# Patient Record
Sex: FEMALE | Race: BLACK OR AFRICAN AMERICAN | Employment: FULL TIME | URBAN - METROPOLITAN AREA
[De-identification: names, ages, dates, MRNs, and addresses within clinical notes are randomized per-mention and may not be internally consistent; named-entity substitution may affect disease eponyms.]

---

## 2017-06-05 ENCOUNTER — APPOINTMENT (OUTPATIENT)
Dept: ULTRASOUND IMAGING | Age: 32
End: 2017-06-05
Attending: PHYSICIAN ASSISTANT
Payer: MEDICAID

## 2017-06-05 ENCOUNTER — HOSPITAL ENCOUNTER (EMERGENCY)
Age: 32
Discharge: LEFT BEFORE DISCHARGE | End: 2017-06-05
Attending: EMERGENCY MEDICINE
Payer: MEDICAID

## 2017-06-05 VITALS
OXYGEN SATURATION: 99 % | SYSTOLIC BLOOD PRESSURE: 102 MMHG | RESPIRATION RATE: 14 BRPM | BODY MASS INDEX: 22.66 KG/M2 | WEIGHT: 153 LBS | DIASTOLIC BLOOD PRESSURE: 62 MMHG | HEART RATE: 78 BPM | TEMPERATURE: 97.3 F | HEIGHT: 69 IN

## 2017-06-05 DIAGNOSIS — N70.93 TUBO-OVARIAN ABSCESS: Primary | ICD-10-CM

## 2017-06-05 LAB
ALBUMIN SERPL BCP-MCNC: 2.8 G/DL (ref 3.4–5)
ALBUMIN/GLOB SERPL: 0.6 {RATIO} (ref 0.8–1.7)
ALP SERPL-CCNC: 68 U/L (ref 45–117)
ALT SERPL-CCNC: 12 U/L (ref 13–56)
ANION GAP BLD CALC-SCNC: 7 MMOL/L (ref 3–18)
APPEARANCE UR: CLEAR
AST SERPL W P-5'-P-CCNC: 9 U/L (ref 15–37)
BACTERIA URNS QL MICRO: ABNORMAL /HPF
BASOPHILS # BLD AUTO: 0.1 K/UL (ref 0–0.06)
BASOPHILS # BLD: 1 % (ref 0–3)
BILIRUB SERPL-MCNC: 0.2 MG/DL (ref 0.2–1)
BILIRUB UR QL: NEGATIVE
BUN SERPL-MCNC: 3 MG/DL (ref 7–18)
BUN/CREAT SERPL: 4 (ref 12–20)
CALCIUM SERPL-MCNC: 9.2 MG/DL (ref 8.5–10.1)
CHLORIDE SERPL-SCNC: 99 MMOL/L (ref 100–108)
CO2 SERPL-SCNC: 33 MMOL/L (ref 21–32)
COLOR UR: YELLOW
CREAT SERPL-MCNC: 0.73 MG/DL (ref 0.6–1.3)
DIFFERENTIAL METHOD BLD: ABNORMAL
EOSINOPHIL # BLD: 0.1 K/UL (ref 0–0.4)
EOSINOPHIL NFR BLD: 1 % (ref 0–5)
EPITH CASTS URNS QL MICRO: ABNORMAL /LPF (ref 0–5)
ERYTHROCYTE [DISTWIDTH] IN BLOOD BY AUTOMATED COUNT: 13.6 % (ref 11.6–14.5)
GLOBULIN SER CALC-MCNC: 4.6 G/DL (ref 2–4)
GLUCOSE SERPL-MCNC: 83 MG/DL (ref 74–99)
GLUCOSE UR STRIP.AUTO-MCNC: NEGATIVE MG/DL
HCG SERPL QL: NEGATIVE
HCT VFR BLD AUTO: 36.4 % (ref 35–45)
HGB BLD-MCNC: 11.8 G/DL (ref 12–16)
HGB UR QL STRIP: ABNORMAL
KETONES UR QL STRIP.AUTO: ABNORMAL MG/DL
LACTATE BLD-SCNC: 0.5 MMOL/L (ref 0.4–2)
LEUKOCYTE ESTERASE UR QL STRIP.AUTO: ABNORMAL
LIPASE SERPL-CCNC: 93 U/L (ref 73–393)
LYMPHOCYTES # BLD AUTO: 33 % (ref 20–51)
LYMPHOCYTES # BLD: 2.7 K/UL (ref 0.8–3.5)
MCH RBC QN AUTO: 29.4 PG (ref 24–34)
MCHC RBC AUTO-ENTMCNC: 32.4 G/DL (ref 31–37)
MCV RBC AUTO: 90.5 FL (ref 74–97)
MONOCYTES # BLD: 0.7 K/UL (ref 0–1)
MONOCYTES NFR BLD AUTO: 8 % (ref 2–9)
MUCOUS THREADS URNS QL MICRO: ABNORMAL /LPF
NEUTS SEG # BLD: 4.6 K/UL (ref 1.8–8)
NEUTS SEG NFR BLD AUTO: 56 % (ref 42–75)
NITRITE UR QL STRIP.AUTO: NEGATIVE
PH UR STRIP: 6 [PH] (ref 5–8)
PLATELET # BLD AUTO: 468 K/UL (ref 135–420)
PLATELET COMMENTS,PCOM: ABNORMAL
PMV BLD AUTO: 10.7 FL (ref 9.2–11.8)
POTASSIUM SERPL-SCNC: 4 MMOL/L (ref 3.5–5.5)
PROT SERPL-MCNC: 7.4 G/DL (ref 6.4–8.2)
PROT UR STRIP-MCNC: ABNORMAL MG/DL
RBC # BLD AUTO: 4.02 M/UL (ref 4.2–5.3)
RBC #/AREA URNS HPF: ABNORMAL /HPF (ref 0–5)
RBC MORPH BLD: ABNORMAL
SERVICE CMNT-IMP: NORMAL
SODIUM SERPL-SCNC: 139 MMOL/L (ref 136–145)
SP GR UR REFRACTOMETRY: 1.02 (ref 1–1.03)
UROBILINOGEN UR QL STRIP.AUTO: 1 EU/DL (ref 0.2–1)
WBC # BLD AUTO: 8.3 K/UL (ref 4.6–13.2)
WBC OTHER # BLD MANUAL: 1 10*3/UL
WBC URNS QL MICRO: ABNORMAL /HPF (ref 0–4)
WET PREP GENITAL: NORMAL

## 2017-06-05 PROCEDURE — 74011000258 HC RX REV CODE- 258: Performed by: PHYSICIAN ASSISTANT

## 2017-06-05 PROCEDURE — 74011250636 HC RX REV CODE- 250/636: Performed by: PHYSICIAN ASSISTANT

## 2017-06-05 PROCEDURE — 99284 EMERGENCY DEPT VISIT MOD MDM: CPT

## 2017-06-05 PROCEDURE — 87491 CHLMYD TRACH DNA AMP PROBE: CPT | Performed by: PHYSICIAN ASSISTANT

## 2017-06-05 PROCEDURE — 87086 URINE CULTURE/COLONY COUNT: CPT | Performed by: PHYSICIAN ASSISTANT

## 2017-06-05 PROCEDURE — 83605 ASSAY OF LACTIC ACID: CPT

## 2017-06-05 PROCEDURE — 96361 HYDRATE IV INFUSION ADD-ON: CPT

## 2017-06-05 PROCEDURE — 80053 COMPREHEN METABOLIC PANEL: CPT | Performed by: PHYSICIAN ASSISTANT

## 2017-06-05 PROCEDURE — 76830 TRANSVAGINAL US NON-OB: CPT

## 2017-06-05 PROCEDURE — 85025 COMPLETE CBC W/AUTO DIFF WBC: CPT | Performed by: PHYSICIAN ASSISTANT

## 2017-06-05 PROCEDURE — 83690 ASSAY OF LIPASE: CPT | Performed by: PHYSICIAN ASSISTANT

## 2017-06-05 PROCEDURE — 96365 THER/PROPH/DIAG IV INF INIT: CPT

## 2017-06-05 PROCEDURE — 96375 TX/PRO/DX INJ NEW DRUG ADDON: CPT

## 2017-06-05 PROCEDURE — 87210 SMEAR WET MOUNT SALINE/INK: CPT | Performed by: PHYSICIAN ASSISTANT

## 2017-06-05 PROCEDURE — 81001 URINALYSIS AUTO W/SCOPE: CPT | Performed by: PHYSICIAN ASSISTANT

## 2017-06-05 PROCEDURE — 84703 CHORIONIC GONADOTROPIN ASSAY: CPT | Performed by: PHYSICIAN ASSISTANT

## 2017-06-05 RX ORDER — KETOROLAC TROMETHAMINE 30 MG/ML
30 INJECTION, SOLUTION INTRAMUSCULAR; INTRAVENOUS
Status: COMPLETED | OUTPATIENT
Start: 2017-06-05 | End: 2017-06-05

## 2017-06-05 RX ORDER — ONDANSETRON 2 MG/ML
4 INJECTION INTRAMUSCULAR; INTRAVENOUS
Status: COMPLETED | OUTPATIENT
Start: 2017-06-05 | End: 2017-06-05

## 2017-06-05 RX ORDER — MORPHINE SULFATE 2 MG/ML
2 INJECTION, SOLUTION INTRAMUSCULAR; INTRAVENOUS
Status: COMPLETED | OUTPATIENT
Start: 2017-06-05 | End: 2017-06-05

## 2017-06-05 RX ADMIN — SODIUM CHLORIDE 1000 ML: 900 INJECTION, SOLUTION INTRAVENOUS at 18:38

## 2017-06-05 RX ADMIN — Medication 2 MG: at 16:34

## 2017-06-05 RX ADMIN — CEFTRIAXONE SODIUM 1 G: 1 INJECTION, POWDER, FOR SOLUTION INTRAMUSCULAR; INTRAVENOUS at 16:35

## 2017-06-05 RX ADMIN — ONDANSETRON 4 MG: 2 INJECTION INTRAMUSCULAR; INTRAVENOUS at 15:59

## 2017-06-05 RX ADMIN — SODIUM CHLORIDE 1000 ML: 900 INJECTION, SOLUTION INTRAVENOUS at 15:58

## 2017-06-05 RX ADMIN — KETOROLAC TROMETHAMINE 30 MG: 30 INJECTION, SOLUTION INTRAMUSCULAR at 16:34

## 2017-06-05 NOTE — ED PROVIDER NOTES
HPI Comments: 3:01 PM Ashlyn Chowdary is a  36 y.o. Normal build  [place] female smoker h/o Depression, Anxiety, Emotional Instability presents to the ED via POV c/o constant worsening bilateral lower pelvic pain, nausea w/o vomiting x 4 days. Decreased appetite. Denies fever/chills, HA, dizziness/LOC, st, cp, cough, sob, v/d, constipation, brbpr, melena, flank pain, blood in urine, difficulty urinating, decreased urination, burning/urgency/freq of urination, vag bleeding, vaginal d/c, vaginal odor. Last BM: Yesterday, Soft, Brown, No straining. LNMP: 5/27/17, ended 6/1/17. PSX: N/A. The history is provided by the patient. Past Medical History:   Diagnosis Date    Anxiety     Depression     Emotional instability        History reviewed. No pertinent surgical history. History reviewed. No pertinent family history. Social History     Social History    Marital status:      Spouse name: N/A    Number of children: N/A    Years of education: N/A     Occupational History    Not on file. Social History Main Topics    Smoking status: Current Every Day Smoker     Packs/day: 0.50    Smokeless tobacco: Not on file    Alcohol use No    Drug use: Yes     Special: Marijuana    Sexual activity: Yes     Partners: Male     Other Topics Concern    Not on file     Social History Narrative         ALLERGIES: Shellfish derived    Review of Systems   Constitutional: Negative for appetite change, chills and fever. HENT: Negative for sore throat, trouble swallowing and voice change. Eyes: Negative for pain, discharge, redness and itching. Respiratory: Negative for cough, choking, chest tightness, shortness of breath, wheezing and stridor. Cardiovascular: Negative for chest pain and palpitations. Gastrointestinal: Positive for abdominal pain and nausea. Negative for blood in stool, constipation, diarrhea and vomiting.    Genitourinary: Positive for pelvic pain. Negative for decreased urine volume, difficulty urinating, dysuria, flank pain, frequency, hematuria, urgency, vaginal bleeding and vaginal discharge. Negative for vaginal odor   Musculoskeletal: Negative for back pain. Skin: Negative for color change, pallor, rash and wound. Neurological: Negative for dizziness, syncope, weakness, light-headedness and headaches. Psychiatric/Behavioral: Negative for behavioral problems. The patient is not nervous/anxious. Vitals:    06/05/17 1416   BP: 99/66   Pulse: 85   Resp: 18   Temp: 98.1 °F (36.7 °C)   SpO2: 98%   Weight: 69.4 kg (153 lb)   Height: 5' 9\" (1.753 m)            Physical Exam   Constitutional: She is oriented to person, place, and time. She appears well-developed and well-nourished. No distress. Cardiovascular: Normal rate, regular rhythm and normal heart sounds. Exam reveals no gallop and no friction rub. No murmur heard. Pulmonary/Chest: Effort normal and breath sounds normal. No respiratory distress. She has no wheezes. She has no rales. Abdominal: Soft. Normal appearance and bowel sounds are normal. She exhibits no distension, no abdominal bruit, no pulsatile midline mass and no mass. There is tenderness in the right lower quadrant, suprapubic area and left lower quadrant. There is guarding. There is no rigidity, no rebound and no CVA tenderness. Hernia confirmed negative in the right inguinal area and confirmed negative in the left inguinal area. Normal inspection. + Guarding. Normoactive BS. Non-distended. No rebound TTP. No CVAT. Genitourinary: Uterus normal. Pelvic exam was performed with patient supine. There is no rash, tenderness, lesion or injury on the right labia. There is no rash, tenderness, lesion or injury on the left labia. Uterus is not deviated, not enlarged, not fixed and not tender. Cervix exhibits motion tenderness and discharge. Cervix exhibits no friability.  Right adnexum displays no mass, no tenderness and no fullness. Left adnexum displays no mass, no tenderness and no fullness. No erythema, tenderness or bleeding in the vagina. No foreign body in the vagina. No signs of injury around the vagina. Vaginal discharge found. Genitourinary Comments: Chaperone: SHAMAR Burden     Moderate Malodorous D/C    Lymphadenopathy:        Right: No inguinal adenopathy present. Left: No inguinal adenopathy present. Neurological: She is alert and oriented to person, place, and time. Skin: Skin is warm and dry. She is not diaphoretic. Nursing note and vitals reviewed. MDM  Number of Diagnoses or Management Options  Tubo-ovarian abscess:   Diagnosis management comments: DDX: Abdominal pain, Pregnancy, Pancreatitis, Ectopic Pregnancy, Gastritis, AMI, Gastroenteritis, Colitis, Diverticulitis, Ovarian Cyst, Ovarian Torsion, Tubo-Ovarian Abscess, PID, PUD, Cholecystitis, Choledocholithiasis, Mesenteric Ischemia, Ectopic Pregnancy Rupture, Pelvic Pain Syndrome, Acute Cystitis, Pyelonephritis, Renal Colic, Biliary Colic, Perforation, Splenic Flexure Syndrome, Abdominal Aortic Aneurysm, Gastrointestinal Bleed. Discussed with JODIE العلي concerning patient Aly Damon, standard discussion of reason for visit, HPI, ROS, PE, and current results available. Recommendation for awaiting labs/imaging results, examine patient and determine dispo. She agrees to assume the care of this patient at this time as the previous provider's shift has come to a close. JODIE Barron  June 5, 2017  6:00 PM     6:23 PM Received report from [place], [place]. Pt awaiting US results. Pt c/o right pelvic pain, discharge, N/V. Pelvic exam per JODIE Hernandez with copious discharge, pt states painful exam. Pt states symptoms x this morning while trying to use restroom. JODIE Norman  7:22 PM US with ?TOA on the right. Discussed results with pt.  Will consult with GYN JODIE Norman  7:32 PM Discussed with Dr. Ignatius Meckel, [facility], will eval pt in ED for oupt management vs surgery. JODIE Vargas  8:50 PM Pt continues to wait for GYN eval.  9:06 PM Pt not in room. Gown on bed. Not in lobby, not outside. Will re-page Dr. Esdras Brambila to update. 9:33 PM Dr. Esdras Brambila in dept to eval pt. She reviewed the images and agrees pt may need admission. If pt returns, start IV abx. Amount and/or Complexity of Data Reviewed  Clinical lab tests: ordered and reviewed  Tests in the radiology section of CPT®: ordered and reviewed  Tests in the medicine section of CPT®: reviewed and ordered  Discussion of test results with the performing providers: yes  Decide to obtain previous medical records or to obtain history from someone other than the patient: yes  Obtain history from someone other than the patient: yes  Review and summarize past medical records: yes  Discuss the patient with other providers: yes  Independent visualization of images, tracings, or specimens: yes    Risk of Complications, Morbidity, and/or Mortality  Presenting problems: moderate  Diagnostic procedures: moderate  Management options: moderate    Patient Progress  Patient progress: stable      Procedures  SCRIBE ATTESTATION STATEMENT  Documented by: Lyubov Correia for, and in the presence of, Karena Caldera PA-C 3:11 PM     Signed by: Mika Rabago, 06/05/17 3:11 PM    PROVIDER ATTESTATION STATEMENT  I personally performed the services described in the documentation, reviewed the documentation, as recorded by the scribe in my presence, and it accurately and completely records my words and actions.   Karena Caldera PA-C

## 2017-06-05 NOTE — ED NOTES
Pt is resting in supine position, extra blankets provided and lights dimmed per pt request. No difficulty or distress noted at this time.

## 2017-06-05 NOTE — DISCHARGE INSTRUCTIONS
[employer] Activation    Thank you for requesting access to [employer]. Please follow the instructions below to securely access and download your online medical record. [employer] allows you to send messages to your doctor, view your test results, renew your prescriptions, schedule appointments, and more. How Do I Sign Up? 1. In your internet browser, go to [email or web address]  2. Click on the First Time User? Click Here link in the Sign In box. You will be redirect to the New Member Sign Up page. 3. Enter your Qwik[employer] Access Code exactly as it appears below. You will not need to use this code after youve completed the sign-up process. If you do not sign up before the expiration date, you must request a new code. Qwik[employer] Access Code: [email or web address]  Expires: 9/3/2017  4:26 PM (This is the date your Qwikwire access code will )    4. Enter the last four digits of your Social Security Number (xxxx) and Date of Birth (mm/dd/yyyy) as indicated and click Submit. You will be taken to the next sign-up page. 5. Create a Qwikwire ID. This will be your [employer]wikwire login ID and cannot be changed, so think of one that is secure and easy to remember. 6. Create a Qwikwire password. You can change your password at any time. 7. Enter your Password Reset Question and Answer. This can be used at a later time if you forget your password. 8. Enter your e-mail address. You will receive e-mail notification when new information is available in [address]. 9. Click Sign Up. You can now view and download portions of your medical record. 10. Click the Download Summary menu link to download a portable copy of your medical information. Additional Information    If you have questions, please visit the Frequently Asked Questions section of the Qwikwire website at [email or web address] [email or web address] Remember, Qwikwire is NOT to be used for urgent needs. For medical emergencies, dial 911.

## 2017-06-05 NOTE — Clinical Note
Drink plenty of water. Please take Tylenol (Acetaminophen) Extra Strength 500 mg tablets, 2 tablets by mouth every 6-8 hours as needed for pain and or fever. NOT TO EXCEED 4000 MG OF ACETAMINOPHEN IN A 24 HOURS PERIOD. You may also take Motrin (Ibupr ofen) 200mg tablets, 3 tablets by mouth every 6-8 hours as needed for pain and or fever, to be taken with food. Do not take other NSAIDS (Non-Steroidal Anti-inflammatories) or medications containing: e.g., Celebrex (Celecoxib), Mobic (Meloxicam), Naproxe n (Naprosyn), Goodies Powder, Aspirin etc... While taking Ibuprofen (Also known as Motrin, Advil). PLEASE FOLLOW-UP AS DIRECTED WITHOUT FAIL WITHIN THE TIME FRAME RECOMMENDED AS FAILURE TO DO SO COULD RESULT IN WORSENING OF YOUR PHYSICAL CONDITION, D EATH, AND OR PERMANENT DISABILITY. RETURN TO THE EMERGENCY DEPARTMENT AT IF YOU ARE UNABLE TO FOLLOW-UP AS DIRECTED. RETURN TO THE EMERGENCY DEPARTMENT AT ONCE IF YOU HAVE SYMPTOMS THAT DO NOT IMPROVE WITH TREATMENT, NEW SYMPTOMS, WORSENING SYMPT OMS, OR ANY OTHER CONCERNS. THE PATIENT AGREES WITH THE DISCHARGE PLAN AND FOLLOW-UP INSTRUCTIONS. THE PATIENT AGREES TO REVIEW ALL HANDOUTS.

## 2017-06-06 ENCOUNTER — HOSPITAL ENCOUNTER (OUTPATIENT)
Age: 32
Setting detail: OBSERVATION
Discharge: HOME | End: 2017-06-07
Attending: EMERGENCY MEDICINE | Admitting: OBSTETRICS & GYNECOLOGY
Payer: MEDICAID

## 2017-06-06 DIAGNOSIS — N73.0 PID (ACUTE PELVIC INFLAMMATORY DISEASE): Primary | ICD-10-CM

## 2017-06-06 DIAGNOSIS — N70.93 RIGHT TUBO-OVARIAN ABSCESS: ICD-10-CM

## 2017-06-06 PROBLEM — N94.89 ADNEXAL MASS: Status: ACTIVE | Noted: 2017-06-05

## 2017-06-06 LAB
ANION GAP BLD CALC-SCNC: 7 MMOL/L (ref 3–18)
APPEARANCE UR: CLEAR
BACTERIA URNS QL MICRO: ABNORMAL /HPF
BASOPHILS # BLD AUTO: 0 K/UL (ref 0–0.06)
BASOPHILS # BLD: 0 % (ref 0–3)
BILIRUB UR QL: NEGATIVE
BUN SERPL-MCNC: 6 MG/DL (ref 7–18)
BUN/CREAT SERPL: 8 (ref 12–20)
CALCIUM SERPL-MCNC: 9.5 MG/DL (ref 8.5–10.1)
CHLORIDE SERPL-SCNC: 104 MMOL/L (ref 100–108)
CO2 SERPL-SCNC: 30 MMOL/L (ref 21–32)
COLOR UR: YELLOW
CREAT SERPL-MCNC: 0.72 MG/DL (ref 0.6–1.3)
DIFFERENTIAL METHOD BLD: ABNORMAL
EOSINOPHIL # BLD: 0 K/UL (ref 0–0.4)
EOSINOPHIL NFR BLD: 0 % (ref 0–5)
EPITH CASTS URNS QL MICRO: ABNORMAL /LPF (ref 0–5)
ERYTHROCYTE [DISTWIDTH] IN BLOOD BY AUTOMATED COUNT: 13.6 % (ref 11.6–14.5)
GLUCOSE SERPL-MCNC: 83 MG/DL (ref 74–99)
GLUCOSE UR STRIP.AUTO-MCNC: 500 MG/DL
HCG UR QL: NEGATIVE
HCT VFR BLD AUTO: 35.9 % (ref 35–45)
HGB BLD-MCNC: 11.8 G/DL (ref 12–16)
HGB UR QL STRIP: ABNORMAL
KETONES UR QL STRIP.AUTO: NEGATIVE MG/DL
LACTATE BLD-SCNC: 0.8 MMOL/L (ref 0.4–2)
LEUKOCYTE ESTERASE UR QL STRIP.AUTO: NEGATIVE
LYMPHOCYTES # BLD AUTO: 45 % (ref 20–51)
LYMPHOCYTES # BLD: 2.9 K/UL (ref 0.8–3.5)
MCH RBC QN AUTO: 29.6 PG (ref 24–34)
MCHC RBC AUTO-ENTMCNC: 32.9 G/DL (ref 31–37)
MCV RBC AUTO: 90 FL (ref 74–97)
MONOCYTES # BLD: 0.2 K/UL (ref 0–1)
MONOCYTES NFR BLD AUTO: 3 % (ref 2–9)
NEUTS SEG # BLD: 3.4 K/UL (ref 1.8–8)
NEUTS SEG NFR BLD AUTO: 52 % (ref 42–75)
NITRITE UR QL STRIP.AUTO: NEGATIVE
PH UR STRIP: 7.5 [PH] (ref 5–8)
PLATELET # BLD AUTO: 527 K/UL (ref 135–420)
PLATELET COMMENTS,PCOM: ABNORMAL
PMV BLD AUTO: 10.5 FL (ref 9.2–11.8)
POTASSIUM SERPL-SCNC: 4.5 MMOL/L (ref 3.5–5.5)
PROT UR STRIP-MCNC: 300 MG/DL
RBC # BLD AUTO: 3.99 M/UL (ref 4.2–5.3)
RBC #/AREA URNS HPF: ABNORMAL /HPF (ref 0–5)
RBC MORPH BLD: ABNORMAL
SODIUM SERPL-SCNC: 141 MMOL/L (ref 136–145)
SP GR UR REFRACTOMETRY: 1.02 (ref 1–1.03)
UROBILINOGEN UR QL STRIP.AUTO: 0.2 EU/DL (ref 0.2–1)
WBC # BLD AUTO: 6.5 K/UL (ref 4.6–13.2)
WBC URNS QL MICRO: ABNORMAL /HPF (ref 0–4)

## 2017-06-06 PROCEDURE — 96365 THER/PROPH/DIAG IV INF INIT: CPT

## 2017-06-06 PROCEDURE — 96367 TX/PROPH/DG ADDL SEQ IV INF: CPT

## 2017-06-06 PROCEDURE — 99218 HC RM OBSERVATION: CPT

## 2017-06-06 PROCEDURE — 74011000258 HC RX REV CODE- 258: Performed by: PHYSICIAN ASSISTANT

## 2017-06-06 PROCEDURE — 74011250636 HC RX REV CODE- 250/636: Performed by: PHYSICIAN ASSISTANT

## 2017-06-06 PROCEDURE — 81001 URINALYSIS AUTO W/SCOPE: CPT | Performed by: EMERGENCY MEDICINE

## 2017-06-06 PROCEDURE — 74011000250 HC RX REV CODE- 250: Performed by: PHYSICIAN ASSISTANT

## 2017-06-06 PROCEDURE — 85025 COMPLETE CBC W/AUTO DIFF WBC: CPT | Performed by: PHYSICIAN ASSISTANT

## 2017-06-06 PROCEDURE — 96375 TX/PRO/DX INJ NEW DRUG ADDON: CPT

## 2017-06-06 PROCEDURE — 99284 EMERGENCY DEPT VISIT MOD MDM: CPT

## 2017-06-06 PROCEDURE — 80048 BASIC METABOLIC PNL TOTAL CA: CPT | Performed by: PHYSICIAN ASSISTANT

## 2017-06-06 PROCEDURE — 83605 ASSAY OF LACTIC ACID: CPT

## 2017-06-06 PROCEDURE — 81025 URINE PREGNANCY TEST: CPT | Performed by: EMERGENCY MEDICINE

## 2017-06-06 PROCEDURE — 87040 BLOOD CULTURE FOR BACTERIA: CPT | Performed by: PHYSICIAN ASSISTANT

## 2017-06-06 PROCEDURE — 96361 HYDRATE IV INFUSION ADD-ON: CPT

## 2017-06-06 RX ORDER — ONDANSETRON 2 MG/ML
4 INJECTION INTRAMUSCULAR; INTRAVENOUS
Status: COMPLETED | OUTPATIENT
Start: 2017-06-06 | End: 2017-06-06

## 2017-06-06 RX ORDER — OXYCODONE AND ACETAMINOPHEN 5; 325 MG/1; MG/1
2 TABLET ORAL
Status: DISCONTINUED | OUTPATIENT
Start: 2017-06-06 | End: 2017-06-07 | Stop reason: HOSPADM

## 2017-06-06 RX ORDER — HYDROMORPHONE HYDROCHLORIDE 1 MG/ML
0.5 INJECTION, SOLUTION INTRAMUSCULAR; INTRAVENOUS; SUBCUTANEOUS
Status: COMPLETED | OUTPATIENT
Start: 2017-06-06 | End: 2017-06-06

## 2017-06-06 RX ORDER — OXYCODONE AND ACETAMINOPHEN 5; 325 MG/1; MG/1
1-2 TABLET ORAL
Status: DISCONTINUED | OUTPATIENT
Start: 2017-06-06 | End: 2017-06-07 | Stop reason: HOSPADM

## 2017-06-06 RX ORDER — IBUPROFEN 400 MG/1
800 TABLET ORAL
Status: DISCONTINUED | OUTPATIENT
Start: 2017-06-06 | End: 2017-06-07 | Stop reason: HOSPADM

## 2017-06-06 RX ADMIN — ONDANSETRON 4 MG: 2 INJECTION INTRAMUSCULAR; INTRAVENOUS at 17:15

## 2017-06-06 RX ADMIN — SODIUM CHLORIDE 1000 ML: 900 INJECTION, SOLUTION INTRAVENOUS at 16:36

## 2017-06-06 RX ADMIN — CEFOTETAN DISODIUM 2 G: 2 INJECTION, POWDER, FOR SOLUTION INTRAMUSCULAR; INTRAVENOUS at 16:37

## 2017-06-06 RX ADMIN — HYDROMORPHONE HYDROCHLORIDE 0.5 MG: 1 INJECTION, SOLUTION INTRAMUSCULAR; INTRAVENOUS; SUBCUTANEOUS at 17:15

## 2017-06-06 RX ADMIN — DOXYCYCLINE 100 MG: 100 INJECTION, POWDER, LYOPHILIZED, FOR SOLUTION INTRAVENOUS at 17:16

## 2017-06-06 NOTE — IP AVS SNAPSHOT
Current Discharge Medication List  
  
START taking these medications Dose & Instructions Dispensing Information Comments Morning Noon Evening Bedtime  
 doxycycline 100 mg capsule Commonly known as:  Kana Layne Your last dose was: Your next dose is:    
   
   
 Dose:  100 mg Take 1 Cap by mouth two (2) times a day for 14 days. Quantity:  28 Cap Refills:  0  
     
   
   
   
  
 ibuprofen 800 mg tablet Commonly known as:  MOTRIN Your last dose was: Your next dose is:    
   
   
 Dose:  800 mg Take 1 Tab by mouth every eight (8) hours as needed. Indications: Pain Quantity:  60 Tab Refills:  3 CONTINUE these medications which have NOT CHANGED Dose & Instructions Dispensing Information Comments Morning Noon Evening Bedtime  
 traZODone 100 mg tablet Commonly known as:  Cheryl Mendoza Your last dose was: Your next dose is:    
   
   
 Dose:  100 mg Take 100 mg by mouth nightly. Refills:  0  
     
   
   
   
  
 VISTARIL 50 mg capsule Generic drug:  hydrOXYzine pamoate Your last dose was: Your next dose is:    
   
   
 Dose:  100 mg Take 100 mg by mouth three (3) times daily as needed for Itching. Refills:  0  
     
   
   
   
  
 ZOLOFT 100 mg tablet Generic drug:  sertraline Your last dose was: Your next dose is: Take  by mouth daily. Refills:  0 Where to Get Your Medications These medications were sent to [address], [address] [facility]  [address] [place] [id number] Phone:  [phone]  
  doxycycline 100 mg capsule  
 ibuprofen 800 mg tablet

## 2017-06-06 NOTE — ED NOTES
I performed a brief evaluation, including history and physical, of the patient here in triage and I have determined that pt will need further treatment and evaluation from the fast track ER physician. I have placed initial orders to help in expediting patients care.      June 06, 2017 at 2:26 PM - Sesar Fitzgerald MD

## 2017-06-06 NOTE — ED PROVIDER NOTES
HPI Comments: Joqauim Al is a 36 y.o. Normal build  [place] female smoker h/o Depression, Anxiety, Emotional Instability presents to the ED via POV presents for evaluation after eloping from the ED yesterday while awaiting OB/GYN Dr. Amparo Clarke, to take her to the OR for tx of suspected TOA. Pt notes constant worsening bilateral lower pelvic pain, nausea w/o vomiting x 45 days. Decreased appetite. Denies fever/chills, HA, dizziness/LOC, st, cp, cough, sob, v/d, constipation, brbpr, melena, flank pain, blood in urine, difficulty urinating, decreased urination, burning/urgency/freq of urination, vag bleeding, vaginal d/c, vaginal odor.      Last BM: 2 days ago, Soft, Brown, No straining.      LNMP: 5/27/17, ended 6/1/17.      PSX: N/A.        Patient is a 36 y.o. female presenting with other event. Other   Associated symptoms include abdominal pain. Pertinent negatives include no headaches and no shortness of breath. Past Medical History:   Diagnosis Date    Adnexal mass 06/05/2017    U/S Result: There is a enlarged, tubular structure within the right adnexa, possibly a dilated fallopian tube. Complex hydrosalpinx or pyosalpinx could be considered. Tubo-ovarian abscess is not entirely excluded.  Anxiety     BV (bacterial vaginosis)     Depression     Emotional instability     Normocytic anemia        History reviewed. No pertinent surgical history. History reviewed. No pertinent family history. Social History     Social History    Marital status:      Spouse name: N/A    Number of children: N/A    Years of education: N/A     Occupational History    Not on file.      Social History Main Topics    Smoking status: Current Every Day Smoker     Packs/day: 0.50    Smokeless tobacco: Not on file    Alcohol use No    Drug use: Yes     Special: Marijuana    Sexual activity: Yes     Partners: Male     Other Topics Concern    Not on file     Social History Narrative         ALLERGIES: Shellfish derived    Review of Systems   Constitutional: Positive for appetite change. Negative for chills and fever. HENT: Negative for mouth sores, sore throat, trouble swallowing and voice change. Eyes: Negative for pain and redness. Respiratory: Negative for cough and shortness of breath. Gastrointestinal: Positive for abdominal pain and nausea. Negative for blood in stool, constipation, diarrhea and vomiting. Genitourinary: Positive for pelvic pain. Negative for decreased urine volume, difficulty urinating, dysuria, flank pain, frequency, hematuria, urgency, vaginal bleeding and vaginal discharge. Neurological: Negative for dizziness, syncope, weakness, light-headedness and headaches. Vitals:    06/06/17 1425   BP: 99/67   Pulse: 94   Resp: 20   Temp: 97.9 °F (36.6 °C)   SpO2: 98%            Physical Exam   Constitutional: She is oriented to person, place, and time. She appears well-developed and well-nourished. No distress. HENT:   Head: Normocephalic and atraumatic. Right Ear: External ear normal.   Left Ear: External ear normal.   Nose: Nose normal.   Mouth/Throat: Oropharynx is clear and moist. No oropharyngeal exudate. Eyes: Conjunctivae and EOM are normal. Pupils are equal, round, and reactive to light. No scleral icterus. Neck: Trachea normal, normal range of motion, full passive range of motion without pain and phonation normal. Neck supple. No tracheal tenderness, no spinous process tenderness and no muscular tenderness present. No rigidity. No tracheal deviation and normal range of motion present. Cardiovascular: Normal rate, regular rhythm and normal heart sounds. Exam reveals no gallop and no friction rub. No murmur heard. Pulmonary/Chest: Effort normal and breath sounds normal. No accessory muscle usage or stridor. No tachypnea. No respiratory distress. She has no decreased breath sounds. She has no wheezes. She has no rhonchi. She has no rales. Symmetric rise/fall of the chest wall. Speaks in full sentences. Great inspiratory effort. Abdominal: Soft. Normal appearance and bowel sounds are normal. She exhibits no distension, no abdominal bruit and no pulsatile midline mass. There is tenderness in the right lower quadrant, suprapubic area and left lower quadrant. There is guarding. There is no rigidity, no rebound and no CVA tenderness. No hernia. Normal inspection. Non-distended. Normoactive BS.     + RLQ/Suprapubic/LLQ TTP. + Guarding. No rebound TTP. No CVAT. Genitourinary:   Genitourinary Comments: Pelvic Exam completed during Yesterday's 6/5/17 ED visit by me. No  exam today. Lymphadenopathy:     She has no cervical adenopathy. Neurological: She is alert and oriented to person, place, and time. She has normal strength. She is not disoriented. No sensory deficit. MS 5/5 BUE/BLE. A&O x 3. Normal gait. Speech clear. Skin: She is not diaphoretic. Nursing note and vitals reviewed. MDM  Number of Diagnoses or Management Options  PID (acute pelvic inflammatory disease): new and requires workup  Right tubo-ovarian abscess: new and requires workup  Diagnosis management comments: DDX: Abdominal pain, Pregnancy, Pancreatitis, Ectopic Pregnancy, Gastritis, AMI, Gastroenteritis, Colitis, Diverticulitis, Ovarian Cyst, Ovarian Torsion, Tubo-Ovarian Abscess, PID, PUD, Cholecystitis, Choledocholithiasis, Mesenteric Ischemia, Ectopic Pregnancy Rupture, Pelvic Pain Syndrome, Acute Cystitis, Pyelonephritis, Renal Colic, Biliary Colic, Perforation, Splenic Flexure Syndrome, Abdominal Aortic Aneurysm, Gastrointestinal Bleed. Pt eloped yesterday while waiting on Dr. Raza Cotnreras OB/[facility] to come bedside and take her to OR for drainage of suspected TOA.      U/S Result 6/5/17:   Final result (Exam End: 6/5/2017  5:48 PM) Open      Study Result     Description: Selected sonographic images of the pelvis are reviewed     Clinical Indication: Severe bilateral pelvic pain for 3 days     Comparison: June 21, 2015.     Findings:      Uterus measures 7.9 x 4.0 x 5.0 cm and has a normal appearance. The cervix  measures 3.6 cm in length and appears closed. There is a small amount of fluid within endometrial canal. Endometrial complex  measures 4 mm in thickness. Right ovary measures 2.6 x 2.4 x 2.3 cm. Within the right adnexa, there is a 4.2  x 2.5 x 2.7 cm homogeneously mildly hypoechoic tubular structure without  internal blood flow. The right ovary appears normally perfused. Left ovary measures 2.8 x 2.1 x 2.0 cm and has a normal appearance. Trace free fluid in the cul-de-sac. No left adnexal mass.     IMPRESSION  Impression:      There is a enlarged, tubular structure within the right adnexa, possibly a  dilated fallopian tube. Complex hydrosalpinx or pyosalpinx could be considered. Tubo-ovarian abscess is not entirely excluded. The ovary itself appears normal.  Careful correlation with pelvic exam necessary. Consider gynecological  consultation.     The left ovary appears normal.     The uterus appears normal.    2:48 PM: [address] page Dr. Gretta Nyhan. Consulted with Dr. Aydee De Anda ([facility]) concerning patient Glory Bales, standard discussion of reason for visit, HPI, ROS, PE, and current results available. Recommendation for admission to Observation. Agrees w/ IV abx chosen. Keep NPO. Call him back if elevated WBC. JODIE Bynum  June 6, 2017  3:50 PM     Reviewed workup results, any meds, and discharge instructions OR admission plan with patient and any family present. Answered all questions.  JODIE Bynum  3:51 PM         Amount and/or Complexity of Data Reviewed  Clinical lab tests: ordered and reviewed  Tests in the radiology section of CPT®: reviewed  Tests in the medicine section of CPT®: reviewed and ordered  Decide to obtain previous medical records or to obtain history from someone other than the patient: yes  Review and summarize past medical records: yes  Discuss the patient with other providers: yes  Independent visualization of images, tracings, or specimens: yes    Risk of Complications, Morbidity, and/or Mortality  Presenting problems: moderate  Diagnostic procedures: moderate        Procedures    Diagnosis:   1. PID (acute pelvic inflammatory disease)    2. Right tubo-ovarian abscess          Disposition: ADMIT    Follow-up Information     None          Patient's Medications   Start Taking    No medications on file   Continue Taking    HYDROXYZINE (VISTARIL) 50 MG CAPSULE    Take 100 mg by mouth three (3) times daily as needed for Itching. SERTRALINE (ZOLOFT) 100 MG TABLET    Take  by mouth daily. TRAZODONE (DESYREL) 100 MG TABLET    Take 100 mg by mouth nightly.    These Medications have changed    No medications on file   Stop Taking    No medications on file       Recent Results (from the past 24 hour(s))   WET PREP    Collection Time: 06/05/17  4:22 PM   Result Value Ref Range    Special Requests: NO SPECIAL REQUESTS      Wet prep MODERATE  CLUE CELLS PRESENT        Wet prep NO TRICHOMONAS SEEN      Wet prep NO YEAST SEEN     POC LACTIC ACID    Collection Time: 06/05/17  7:44 PM   Result Value Ref Range    Lactic Acid (POC) 0.5 0.4 - 2.0 mmol/L   URINALYSIS W/ RFLX MICROSCOPIC    Collection Time: 06/06/17  3:02 PM   Result Value Ref Range    Color YELLOW      Appearance CLEAR      Specific gravity 1.016 1.005 - 1.030      pH (UA) 7.5 5.0 - 8.0      Protein 300 (A) NEG mg/dL    Glucose 500 (A) NEG mg/dL    Ketone NEGATIVE  NEG mg/dL    Bilirubin NEGATIVE  NEG      Blood SMALL (A) NEG      Urobilinogen 0.2 0.2 - 1.0 EU/dL    Nitrites NEGATIVE  NEG      Leukocyte Esterase NEGATIVE  NEG     HCG URINE, QL    Collection Time: 06/06/17  3:02 PM   Result Value Ref Range    HCG urine, Ql. NEGATIVE  NEG

## 2017-06-06 NOTE — PROGRESS NOTES
Bedside and Verbal shift change report given to Michael IBANEZ (oncoming nurse) by Sindhu Griffin LPN (offgoing nurse). Report included the following information SBAR, Kardex, MAR and Recent Results. SITUATION:    Code Status: No Order   Reason for Admission: Right Adnexal Mass, Possible Tubo-Ovarian Abscess    [facility] day: 0   Problem List:       Hospital Problems  Date Reviewed: 6/5/2017    None          BACKGROUND:    Past Medical History:   Past Medical History:   Diagnosis Date    Adnexal mass 06/05/2017    U/S Result: There is a enlarged, tubular structure within the right adnexa, possibly a dilated fallopian tube. Complex hydrosalpinx or pyosalpinx could be considered. Tubo-ovarian abscess is not entirely excluded.                     Anxiety     BV (bacterial vaginosis)     Depression     Emotional instability     Normocytic anemia          Patient taking anticoagulants no     ASSESSMENT:    Changes in Assessment Throughout Shift: none     Patient has Central Line: no   Patient has Baker Cath: no    Last Vitals:     Vitals:    06/06/17 1611 06/06/17 1707 06/06/17 1803 06/06/17 1807   BP: 107/88 110/68 109/69    Pulse: 87 68 95    Resp:  16 18    Temp:  97.6 °F (36.4 °C) 96.9 °F (36.1 °C)    SpO2: 100% 100% 97%    Weight:    72.6 kg (160 lb 1.6 oz)   Height:    5' 9\" (1.753 m)        IV and DRAINS (will only show if present)   Peripheral IV 06/06/17 Left Antecubital-Site Assessment: Clean, dry, & intact     WOUND (if present)   Wound Type:  none   Dressing present     Wound Concerns/Notes:  none     PAIN    Pain Assessment    Pain Intensity 1: 7 (06/06/17 1638)    Pain Location 1: Abdomen         Patient Stated Pain Goal: 7  o Interventions for Pain:  Dilaudid IVIntervention effective: yes  o Time of last intervention: 4129   o Reassessment Completed: yes      Last 3 Weights:  Last 3 Recorded Weights in this Encounter    06/06/17 1807   Weight: 72.6 kg (160 lb 1.6 oz)     Weight change:  INTAKE/OUPUT    Current Shift:      Last three shifts:       LAB RESULTS     Recent Labs      06/06/17   1555  06/05/17   1525   WBC  6.5  8.3   HGB  11.8*  11.8*   HCT  35.9  36.4   PLT  527*  468*        Recent Labs      06/06/17   1555  06/05/17   1525   NA  141  139   K  4.5  4.0   GLU  83  83   BUN  6*  3*   CREA  0.72  0.73   CA  9.5  9.2       RECOMMENDATIONS AND DISCHARGE PLANNING     1. Pending tests/procedures/ Plan of Care or Other Needs: None     2. Discharge plan for patient and Needs/Barriers: home    3. Estimated Discharge Date: 6/8/2017 Posted on Whiteboard in [facility] Room: yes      4. The patient's care plan was reviewed with the oncoming nurse. \"HEALS\" SAFETY CHECK      Fall Risk    Total Score: 1    Safety Measures:      A safety check occurred in the patient's room between off going nurse and oncoming nurse listed above. The safety check included the below items  Area Items   H  High Alert Medications - Verify all high alert medication drips (heparin, PCA, etc.)   E  Equipment - Suction is set up for ALL patients (with susy)  - Red plugs utilized for all equipment (IV pumps, etc.)  - WOWs wiped down at end of shift.  - Room stocked with oxygen, suction, and other unit-specific supplies   A  Alarms - Bed alarm is set for fall risk patients  - Ensure chair alarm is in place and activated if patient is up in a chair   L  Lines - Check IV for any infiltration  - Baker bag is empty if patient has a Baker   - Tubing and IV bags are labeled   S  Safety   - Room is clean, patient is clean, and equipment is clean. - Hallways are clear from equipment besides carts. - Fall bracelet on for fall risk patients  - Ensure room is clear and free of clutter  - Suction is set up for ALL patients (with susy)  - Hallways are clear from equipment besides carts.    - Isolation precautions followed, supplies available outside room, sign posted     Jeanette Nascimento LPN

## 2017-06-06 NOTE — ED NOTES
6:04 AM: Informed by JODIE Laguna that this patient eloped, U/S + TOA. Requires notification of result and return to ED. I called Emergency Contact, spoke w/ Aunt, informing her to please contact patient and have her return to Emergency Room to discuss results as it is vitally important. She verbalized understanding and agreed.

## 2017-06-06 NOTE — ED NOTES
TRANSFER - OUT REPORT:    Verbal report given to [facility] RN  on [facility]  being transferred to [facility] [phone] (unit) for routine progression of care       Report consisted of patients Situation, Background, Assessment and   Recommendations(SBAR). Information from the following report(s) ED Summary and MAR was reviewed with the receiving nurse. Lines:   Peripheral IV 06/05/17 Left Antecubital (Active)       Peripheral IV 06/06/17 Left Antecubital (Active)   Site Assessment Clean, dry, & intact 6/6/2017  5:46 PM   Dressing Type Transparent 6/6/2017  5:46 PM        Opportunity for questions and clarification was provided.       Patient transported with:   [employer]

## 2017-06-06 NOTE — ED NOTES
Attempted call back number listed on patients chart. For pt to return for iv to be removed. Left message on machine.     [facility] non emergency number,  To ensure iv removed

## 2017-06-06 NOTE — IP AVS SNAPSHOT
[facility] Screen 
 
 
 [address] Patient: Priscilla Diazer MRN: [id number] You are allergic to the following Allergen Reactions Shellfish Derived Anaphylaxis Recent Documentation Height Weight Breastfeeding? BMI OB Status Smoking Status 1.753 m 72.6 kg No 23.64 kg/m2 Having regular periods Current Every Day Smoker Unresulted Labs Order Current Status CULTURE, BLOOD Preliminary result CULTURE, BLOOD Preliminary result Emergency Contacts Name Discharge Info Relation Home Work Mobile Vanessa Renee  Other Relative [6] [phone] About your hospitalization You were admitted on:  June 6, 2017 You last received care in the:  [facility] [address] You were discharged on:  June 7, 2017 Unit phone number:  [phone] Why you were hospitalized Your primary diagnosis was:  Not on File Providers Seen During Your Hospitalizations Provider Role Specialty Primary office phone Israel Chan MD Attending Provider Emergency Medicine [phone] Judit Martinez MD Attending Provider Obstetrics & Gynecology [phone] Your Primary Care Physician (PCP) Primary Care Physician Office Phone Office Fax NONE ** None ** ** None ** Follow-up Information Follow up With Details Comments Contact Info Gabrielle Pham MD In 2 weeks  [facility] [place] [address] [address] [place] [address] 
[phone] Current Discharge Medication List  
  
START taking these medications Dose & Instructions Dispensing Information Comments Morning Noon Evening Bedtime  
 doxycycline 100 mg capsule Commonly known as:  Valerie Flank Your last dose was: Your next dose is:    
   
   
 Dose:  100 mg Take 1 Cap by mouth two (2) times a day for 14 days. Quantity:  28 Cap Refills:  0 ibuprofen 800 mg tablet Commonly known as:  MOTRIN Your last dose was: Your next dose is:    
   
   
 Dose:  800 mg Take 1 Tab by mouth every eight (8) hours as needed. Indications: Pain Quantity:  60 Tab Refills:  3 CONTINUE these medications which have NOT CHANGED Dose & Instructions Dispensing Information Comments Morning Noon Evening Bedtime  
 traZODone 100 mg tablet Commonly known as:  Rutkaren Boga Your last dose was: Your next dose is:    
   
   
 Dose:  100 mg Take 100 mg by mouth nightly. Refills:  0  
     
   
   
   
  
 VISTARIL 50 mg capsule Generic drug:  hydrOXYzine pamoate Your last dose was: Your next dose is:    
   
   
 Dose:  100 mg Take 100 mg by mouth three (3) times daily as needed for Itching. Refills:  0  
     
   
   
   
  
 ZOLOFT 100 mg tablet Generic drug:  sertraline Your last dose was: Your next dose is: Take  by mouth daily. Refills:  0 Where to Get Your Medications These medications were sent to [address] Christoph Monterroso [address] [place] [address] Phone:  [phone]  
  doxycycline 100 mg capsule  
 ibuprofen 800 mg tablet Discharge Instructions DISCHARGE SUMMARY from Nurse The following personal items are in your possession at time of discharge: 
 
Dental Appliances: None Visual Aid: None Home Medications: None Jewelry: Shelvy Billing Clothing: Socks, Undergarments, Footwear, Shirt, With patient Other Valuables: Cell Phone (with ) PATIENT INSTRUCTIONS: 
 
 
F-face looks uneven A-arms unable to move or move unevenly S-speech slurred or non-existent T-time-call 911 as soon as signs and symptoms begin-DO NOT go Back to bed or wait to see if you get better-TIME IS BRAIN. Warning Signs of HEART ATTACK Call 911 if you have these symptoms: 
? Chest discomfort. Most heart attacks involve discomfort in the center of the chest that lasts more than a few minutes, or that goes away and comes back. It can feel like uncomfortable pressure, squeezing, fullness, or pain. ? Discomfort in other areas of the upper body. Symptoms can include pain or discomfort in one or both arms, the back, neck, jaw, or stomach. ? Shortness of breath with or without chest discomfort. ? Other signs may include breaking out in a cold sweat, nausea, or lightheadedness. Don't wait more than five minutes to call [address]! Fast action can save your life. Calling 911 is almost always the fastest way to get lifesaving treatment. Emergency Medical Services staff can begin treatment when they arrive  up to an hour sooner than if someone gets to the hospital by car. The discharge information has been reviewed with the patient. The patient verbalized understanding. Discharge medications reviewed with the patient and appropriate educational materials and side effects teaching were provided. Patient armband removed and shredded Out[employer] Activation Thank you for requesting access to [employer]. Please follow the instructions below to securely access and download your online medical record. [employer] allows you to send messages to your doctor, view your test results, renew your prescriptions, schedule appointments, and more. How Do I Sign Up? 1. In your internet browser, go to [email or web address] 
 2. Click on the First Time User? Click Here link in the Sign In box. You will be redirect to the New Member Sign Up page. 3. Enter your Copy[employer] Access Code exactly as it appears below. You will not need to use this code after youve completed the sign-up process. If you do not sign up before the expiration date, you must request a new code. Copybar Access Code: [email or web address] Expires: 9/3/2017  4:26 PM (This is the date your Copybar access code will ) 4. Enter the last four digits of your Social Security Number (xxxx) and Date of Birth (mm/dd/yyyy) as indicated and click Submit. You will be taken to the next sign-up page. 5. Create a Copybar ID. This will be your Copybar login ID and cannot be changed, so think of one that is secure and easy to remember. 6. Create a Copybar password. You can change your password at any time. 7. Enter your Password Reset Question and Answer. This can be used at a later time if you forget your password. 8. Enter your e-mail address. You will receive e-mail notification when new information is available in [address] 9. Click Sign Up. You can now view and download portions of your medical record. 10. Click the Download Summary menu link to download a portable copy of your medical information. Additional Information If you have questions, please visit the Frequently Asked Questions section of the Copybar website at [email or web address] Remember, Copybar is NOT to be used for urgent needs. For medical emergencies, dial 911. Discharge Orders None Copybar Announcement We are excited to announce that we are making your provider's discharge notes available to you in Copybar. You will see these notes when they are completed and signed by the physician that discharged you from your recent hospital stay.   If you have any questions or concerns about any information you see in [employer], please call the Health Information Department where you were seen or reach out to your Primary Care Provider for more information about your plan of care. Introducing [facility]! Toribio Nyhan introduces [employer] patient portal. Now you can access parts of your medical record, email your doctor's office, and request medication refills online. 1. In your internet browser, go to [email or web address] 2. Click on the First Time User? Click Here link in the Sign In box. You will see the New Member Sign Up page. 3. Enter your [employer] Access Code exactly as it appears below. You will not need to use this code after youve completed the sign-up process. If you do not sign up before the expiration date, you must request a new code. · [employer] Access Code: [email or web address] Expires: 9/3/2017  4:26 PM 
 
4. Enter the last four digits of your Social Security Number (xxxx) and Date of Birth (mm/dd/yyyy) as indicated and click Submit. You will be taken to the next sign-up page. 5. Create a [employer] ID. This will be your [employer] login ID and cannot be changed, so think of one that is secure and easy to remember. 6. Create a [employer] password. You can change your password at any time. 7. Enter your Password Reset Question and Answer. This can be used at a later time if you forget your password. 8. Enter your e-mail address. You will receive e-mail notification when new information is available in [address] 9. Click Sign Up. You can now view and download portions of your medical record. 10. Click the Download Summary menu link to download a portable copy of your medical information. If you have questions, please visit the Frequently Asked Questions section of the [employer] website. Remember, [employer] is NOT to be used for urgent needs. For medical emergencies, dial 911. Now available from your iPhone and Android! General Information Please provide this summary of care documentation to your next provider. Patient Signature:  ____________________________________________________________ Date:  ____________________________________________________________  
  
Lidia Iba Provider Signature:  ____________________________________________________________ Date:  ____________________________________________________________

## 2017-06-07 VITALS
DIASTOLIC BLOOD PRESSURE: 67 MMHG | WEIGHT: 160.1 LBS | HEIGHT: 69 IN | RESPIRATION RATE: 18 BRPM | HEART RATE: 60 BPM | OXYGEN SATURATION: 100 % | SYSTOLIC BLOOD PRESSURE: 105 MMHG | TEMPERATURE: 97.2 F | BODY MASS INDEX: 23.71 KG/M2

## 2017-06-07 LAB
BACTERIA SPEC CULT: NORMAL
BASOPHILS # BLD AUTO: 0 K/UL (ref 0–0.06)
BASOPHILS # BLD: 0 % (ref 0–3)
C TRACH RRNA SPEC QL NAA+PROBE: NEGATIVE
DIFFERENTIAL METHOD BLD: ABNORMAL
EOSINOPHIL # BLD: 0.1 K/UL (ref 0–0.4)
EOSINOPHIL NFR BLD: 1 % (ref 0–5)
ERYTHROCYTE [DISTWIDTH] IN BLOOD BY AUTOMATED COUNT: 13.7 % (ref 11.6–14.5)
HCT VFR BLD AUTO: 32.2 % (ref 35–45)
HGB BLD-MCNC: 10.5 G/DL (ref 12–16)
LYMPHOCYTES # BLD AUTO: 49 % (ref 20–51)
LYMPHOCYTES # BLD: 3.1 K/UL (ref 0.8–3.5)
MCH RBC QN AUTO: 29.4 PG (ref 24–34)
MCHC RBC AUTO-ENTMCNC: 32.6 G/DL (ref 31–37)
MCV RBC AUTO: 90.2 FL (ref 74–97)
MONOCYTES # BLD: 0.4 K/UL (ref 0–1)
MONOCYTES NFR BLD AUTO: 6 % (ref 2–9)
N GONORRHOEA RRNA SPEC QL NAA+PROBE: POSITIVE
NEUTS SEG # BLD: 2.8 K/UL (ref 1.8–8)
NEUTS SEG NFR BLD AUTO: 44 % (ref 42–75)
PLATELET # BLD AUTO: 437 K/UL (ref 135–420)
PLATELET COMMENTS,PCOM: ABNORMAL
PMV BLD AUTO: 10.6 FL (ref 9.2–11.8)
RBC # BLD AUTO: 3.57 M/UL (ref 4.2–5.3)
RBC MORPH BLD: ABNORMAL
SERVICE CMNT-IMP: NORMAL
SPECIMEN SOURCE: ABNORMAL
WBC # BLD AUTO: 6.4 K/UL (ref 4.6–13.2)

## 2017-06-07 PROCEDURE — 99218 HC RM OBSERVATION: CPT

## 2017-06-07 PROCEDURE — 74011250637 HC RX REV CODE- 250/637: Performed by: OBSTETRICS & GYNECOLOGY

## 2017-06-07 PROCEDURE — 36415 COLL VENOUS BLD VENIPUNCTURE: CPT | Performed by: OBSTETRICS & GYNECOLOGY

## 2017-06-07 PROCEDURE — 85025 COMPLETE CBC W/AUTO DIFF WBC: CPT | Performed by: OBSTETRICS & GYNECOLOGY

## 2017-06-07 RX ORDER — DOXYCYCLINE 100 MG/1
100 CAPSULE ORAL 2 TIMES DAILY
Qty: 28 CAP | Refills: 0 | Status: SHIPPED | OUTPATIENT
Start: 2017-06-07 | End: 2017-06-21

## 2017-06-07 RX ORDER — IBUPROFEN 800 MG/1
800 TABLET ORAL
Qty: 60 TAB | Refills: 3 | Status: SHIPPED | OUTPATIENT
Start: 2017-06-07 | End: 2017-10-17

## 2017-06-07 RX ADMIN — OXYCODONE HYDROCHLORIDE AND ACETAMINOPHEN 1 TABLET: 5; 325 TABLET ORAL at 00:45

## 2017-06-07 RX ADMIN — OXYCODONE HYDROCHLORIDE AND ACETAMINOPHEN 2 TABLET: 5; 325 TABLET ORAL at 08:26

## 2017-06-07 NOTE — H&P
ADMISSION    Subjective:   Ms Kameron Quintana is 35 yo female  whose Osman Munda was 2017. She is not on any contraceptive. She presented in the ED again with complaints of lower abdominal pains without any chills or fever. She denies any chills or fever. She was admitted to the floor for observation and for treatment with antibiotics. Objective:   Physical Exam:  Patient Vitals for the past 8 hrs:   BP Temp Pulse Resp SpO2 Height Weight   17 2105 116/79 97.3 °F (36.3 °C) 64 18 98 % - -   17 1807 - - - - - 5' 9\" (1.753 m) 160 lb 1.6 oz (72.6 kg)   17 1803 109/69 96.9 °F (36.1 °C) 95 18 97 % - -   17 1707 110/68 97.6 °F (36.4 °C) 68 16 100 % - -   17 1611 107/88 - 87 - 100 % - -   17 1425 99/67 97.9 °F (36.6 °C) 94 20 98 % - -     The US done was essentially within normal limits, only showing possible hydrosalpinx/ R/O pyosalpinx. Lungs:clear  Abdomen:soft or tender in the lower abdomen with some rebound tenderness mostly in the LLQ  Vagina:-deferred since it was recently done in the ED.     Recent Results (from the past 12 hour(s))   URINALYSIS W/ RFLX MICROSCOPIC    Collection Time: 17  3:02 PM   Result Value Ref Range    Color YELLOW      Appearance CLEAR      Specific gravity 1.016 1.005 - 1.030      pH (UA) 7.5 5.0 - 8.0      Protein 300 (A) NEG mg/dL    Glucose 500 (A) NEG mg/dL    Ketone NEGATIVE  NEG mg/dL    Bilirubin NEGATIVE  NEG      Blood SMALL (A) NEG      Urobilinogen 0.2 0.2 - 1.0 EU/dL    Nitrites NEGATIVE  NEG      Leukocyte Esterase NEGATIVE  NEG     HCG URINE, QL    Collection Time: 17  3:02 PM   Result Value Ref Range    HCG urine, Ql. NEGATIVE  NEG     URINE MICROSCOPIC ONLY    Collection Time: 17  3:02 PM   Result Value Ref Range    WBC NONE 0 - 4 /hpf    RBC 1 to 3 0 - 5 /hpf    Epithelial cells FEW 0 - 5 /lpf    Bacteria FEW (A) NEG /hpf   CBC WITH AUTOMATED DIFF    Collection Time: 06/06/17  3:55 PM   Result Value Ref Range    WBC 6.5 4.6 - 13.2 K/uL    RBC 3.99 (L) 4.20 - 5.30 M/uL    HGB 11.8 (L) 12.0 - 16.0 g/dL    HCT 35.9 35.0 - 45.0 %    MCV 90.0 74.0 - 97.0 FL    MCH 29.6 24.0 - 34.0 PG    MCHC 32.9 31.0 - 37.0 g/dL    RDW 13.6 11.6 - 14.5 %    PLATELET 809 (H) 944 - 420 K/uL    MPV 10.5 9.2 - 11.8 FL    NEUTROPHILS 52 42 - 75 %    LYMPHOCYTES 45 20 - 51 %    MONOCYTES 3 2 - 9 %    EOSINOPHILS 0 0 - 5 %    BASOPHILS 0 0 - 3 %    ABS. NEUTROPHILS 3.4 1.8 - 8.0 K/UL    ABS. LYMPHOCYTES 2.9 0.8 - 3.5 K/UL    ABS. MONOCYTES 0.2 0 - 1.0 K/UL    ABS. EOSINOPHILS 0.0 0.0 - 0.4 K/UL    ABS. BASOPHILS 0.0 0.0 - 0.06 K/UL    DF MANUAL      PLATELET COMMENTS INCREASED PLATELETS      RBC COMMENTS NORMOCYTIC, NORMOCHROMIC     METABOLIC PANEL, BASIC    Collection Time: 06/06/17  3:55 PM   Result Value Ref Range    Sodium 141 136 - 145 mmol/L    Potassium 4.5 3.5 - 5.5 mmol/L    Chloride 104 100 - 108 mmol/L    CO2 30 21 - 32 mmol/L    Anion gap 7 3.0 - 18 mmol/L    Glucose 83 74 - 99 mg/dL    BUN 6 (L) 7.0 - 18 MG/DL    Creatinine 0.72 0.6 - 1.3 MG/DL    BUN/Creatinine ratio 8 (L) 12 - 20      GFR est AA >60 >60 ml/min/1.73m2    GFR est non-AA >60 >60 ml/min/1.73m2    Calcium 9.5 8.5 - 10.1 MG/DL   POC LACTIC ACID    Collection Time: 06/06/17  3:58 PM   Result Value Ref Range    Lactic Acid (POC) 0.8 0.4 - 2.0 mmol/L       Assessment/Plan:     PID. R/O Left Hydrosalpinx/Pyosalpinx. Continue her on Cefotan 2gm iv q12h and Doxycycline 100 mg iv bid. Start on regular diet or diet as tolerated. Plan discharge on 6/7/17.       Judit Martinez MD  June 6, 2017

## 2017-06-07 NOTE — ROUTINE PROCESS
Bedside and Verbal shift change report given to Jennifer Bates LPN (oncoming nurse) by Georgiana Wagner RN (offgoing nurse). Report included the following information SBAR, Kardex, MAR and Recent Results. SITUATION:    Code Status: No Order   Reason for Admission: Right Adnexal Mass, Possible Tubo-Ovarian Abscess    [facility] day: 0   Problem List:       Hospital Problems  Date Reviewed: 6/5/2017    None          BACKGROUND:    Past Medical History:   Past Medical History:   Diagnosis Date    Adnexal mass 06/05/2017    U/S Result: There is a enlarged, tubular structure within the right adnexa, possibly a dilated fallopian tube. Complex hydrosalpinx or pyosalpinx could be considered. Tubo-ovarian abscess is not entirely excluded.                     Anxiety     BV (bacterial vaginosis)     Depression     Emotional instability     Normocytic anemia          Patient taking anticoagulants no     ASSESSMENT:    Changes in Assessment Throughout Shift: none     Patient has Central Line: no Reasons if yes: n/a   Patient has Baker Cath: no Reasons if yes: n/a      Last Vitals:     Vitals:    06/06/17 1707 06/06/17 1803 06/06/17 1807 06/06/17 2105   BP: 110/68 109/69  116/79   Pulse: 68 95  64   Resp: 16 18  18   Temp: 97.6 °F (36.4 °C) 96.9 °F (36.1 °C)  97.3 °F (36.3 °C)   SpO2: 100% 97%  98%   Weight:   72.6 kg (160 lb 1.6 oz)    Height:   5' 9\" (1.753 m)         IV and DRAINS (will only show if present)   Peripheral IV 06/06/17 Left Antecubital-Site Assessment: Clean, dry, & intact     WOUND (if present)   Wound Type:  none   Dressing present     Wound Concerns/Notes:  none     PAIN    Pain Assessment    Pain Intensity 1: 7 (06/06/17 1638)    Pain Location 1: Abdomen         Patient Stated Pain Goal: 7  o Interventions for Pain:  Percocet  o Intervention effective: yes  o Time of last intervention: 0045   o Reassessment Completed: yes      Last 3 Weights:  Last 3 Recorded Weights in this Encounter 06/06/17 1807   Weight: 72.6 kg (160 lb 1.6 oz)     Weight change:      INTAKE/OUPUT    Current Shift:      Last three shifts:       LAB RESULTS     Recent Labs      06/06/17   1555  06/05/17   1525   WBC  6.5  8.3   HGB  11.8*  11.8*   HCT  35.9  36.4   PLT  527*  468*        Recent Labs      06/06/17   1555  06/05/17   1525   NA  141  139   K  4.5  4.0   GLU  83  83   BUN  6*  3*   CREA  0.72  0.73   CA  9.5  9.2       RECOMMENDATIONS AND DISCHARGE PLANNING     1. Pending tests/procedures/ Plan of Care or Other Needs: antibiotics, pain control     2. Discharge plan for patient and Needs/Barriers: home    3. Estimated Discharge Date: 6/7/17 Posted on Whiteboard in [facility] Room: yes      4. The patient's care plan was reviewed with the oncoming nurse. \"HEALS\" SAFETY CHECK      Fall Risk    Total Score: 1    Safety Measures: Safety Measures: Bed/Chair-Wheels locked, Bed in low position, Call light within reach    A safety check occurred in the patient's room between off going nurse and oncoming nurse listed above. The safety check included the below items  Area Items   H  High Alert Medications - Verify all high alert medication drips (heparin, PCA, etc.)   E  Equipment - Suction is set up for ALL patients (with susy)  - Red plugs utilized for all equipment (IV pumps, etc.)  - WOWs wiped down at end of shift.  - Room stocked with oxygen, suction, and other unit-specific supplies   A  Alarms - Bed alarm is set for fall risk patients  - Ensure chair alarm is in place and activated if patient is up in a chair   L  Lines - Check IV for any infiltration  - Baker bag is empty if patient has a Baker   - Tubing and IV bags are labeled   S  Safety   - Room is clean, patient is clean, and equipment is clean. - Hallways are clear from equipment besides carts.    - Fall bracelet on for fall risk patients  - Ensure room is clear and free of clutter  - Suction is set up for ALL patients (with susy)  - Hallways are clear from equipment besides carts.    - Isolation precautions followed, supplies available outside room, sign posted     Carla Oliveira RN

## 2017-06-07 NOTE — PROGRESS NOTES
Gynecology Progress Note    [facility]    [facility]    She is without significant complaints but is very ready to go home. Pain controlled, seems to come in waves. Voiding without difficulty. She is is tolerating her diet.         Current Facility-Administered Medications   Medication Dose Route Frequency       Vitals:  Visit Vitals    /67 (BP 1 Location: Right arm, BP Patient Position: At rest)    Pulse 60    Temp 97.2 °F (36.2 °C)    Resp 18    Ht 5' 9\" (1.753 m)    Wt 72.6 kg (160 lb 1.6 oz)    SpO2 100%    Breastfeeding No    BMI 23.64 kg/m2     Temp (24hrs), Av.4 °F (36.3 °C), Min:96.9 °F (36.1 °C), Max:97.9 °F (36.6 °C)      Last 24hr Input/Output:    Intake/Output Summary (Last 24 hours) at 17 1129  Last data filed at 17 0525   Gross per 24 hour   Intake              480 ml   Output              400 ml   Net               80 ml          Exam:  General: alert, cooperative, no distress, appears stated age     Abdomen: abdomen is soft without significant tenderness, masses, organomegaly or guarding     Extremities: extremities normal, atraumatic, no cyanosis or edema      Labs: Lab Results   Component Value Date/Time    WBC 6.4 2017 04:08 AM    WBC 6.5 2017 03:55 PM    WBC 8.3 2017 03:25 PM    HGB 10.5 2017 04:08 AM    HGB 11.8 2017 03:55 PM    HGB 11.8 2017 03:25 PM    HCT 32.2 2017 04:08 AM    HCT 35.9 2017 03:55 PM    HCT 36.4 2017 03:25 PM    PLATELET 452  04:08 AM    PLATELET 686  03:55 PM    PLATELET 906  03:25 PM       Recent Results (from the past 24 hour(s))   URINALYSIS W/ RFLX MICROSCOPIC    Collection Time: 17  3:02 PM   Result Value Ref Range    Color YELLOW      Appearance CLEAR      Specific gravity 1.016 1.005 - 1.030      pH (UA) 7.5 5.0 - 8.0      Protein 300 (A) NEG mg/dL    Glucose 500 (A) NEG mg/dL    Ketone NEGATIVE  NEG mg/dL    Bilirubin NEGATIVE  NEG      Blood SMALL (A) NEG      Urobilinogen 0.2 0.2 - 1.0 EU/dL    Nitrites NEGATIVE  NEG      Leukocyte Esterase NEGATIVE  NEG     HCG URINE, QL    Collection Time: 06/06/17  3:02 PM   Result Value Ref Range    HCG urine, Ql. NEGATIVE  NEG     URINE MICROSCOPIC ONLY    Collection Time: 06/06/17  3:02 PM   Result Value Ref Range    WBC NONE 0 - 4 /hpf    RBC 1 to 3 0 - 5 /hpf    Epithelial cells FEW 0 - 5 /lpf    Bacteria FEW (A) NEG /hpf   CBC WITH AUTOMATED DIFF    Collection Time: 06/06/17  3:55 PM   Result Value Ref Range    WBC 6.5 4.6 - 13.2 K/uL    RBC 3.99 (L) 4.20 - 5.30 M/uL    HGB 11.8 (L) 12.0 - 16.0 g/dL    HCT 35.9 35.0 - 45.0 %    MCV 90.0 74.0 - 97.0 FL    MCH 29.6 24.0 - 34.0 PG    MCHC 32.9 31.0 - 37.0 g/dL    RDW 13.6 11.6 - 14.5 %    PLATELET 258 (H) 352 - 420 K/uL    MPV 10.5 9.2 - 11.8 FL    NEUTROPHILS 52 42 - 75 %    LYMPHOCYTES 45 20 - 51 %    MONOCYTES 3 2 - 9 %    EOSINOPHILS 0 0 - 5 %    BASOPHILS 0 0 - 3 %    ABS. NEUTROPHILS 3.4 1.8 - 8.0 K/UL    ABS. LYMPHOCYTES 2.9 0.8 - 3.5 K/UL    ABS. MONOCYTES 0.2 0 - 1.0 K/UL    ABS. EOSINOPHILS 0.0 0.0 - 0.4 K/UL    ABS.  BASOPHILS 0.0 0.0 - 0.06 K/UL    DF MANUAL      PLATELET COMMENTS INCREASED PLATELETS      RBC COMMENTS NORMOCYTIC, NORMOCHROMIC     METABOLIC PANEL, BASIC    Collection Time: 06/06/17  3:55 PM   Result Value Ref Range    Sodium 141 136 - 145 mmol/L    Potassium 4.5 3.5 - 5.5 mmol/L    Chloride 104 100 - 108 mmol/L    CO2 30 21 - 32 mmol/L    Anion gap 7 3.0 - 18 mmol/L    Glucose 83 74 - 99 mg/dL    BUN 6 (L) 7.0 - 18 MG/DL    Creatinine 0.72 0.6 - 1.3 MG/DL    BUN/Creatinine ratio 8 (L) 12 - 20      GFR est AA >60 >60 ml/min/1.73m2    GFR est non-AA >60 >60 ml/min/1.73m2    Calcium 9.5 8.5 - 10.1 MG/DL   CULTURE, BLOOD    Collection Time: 06/06/17  3:55 PM   Result Value Ref Range    Special Requests: NO SPECIAL REQUESTS      Culture result: NO GROWTH AFTER 15 HOURS     POC LACTIC ACID    Collection Time: 06/06/17  3:58 PM   Result Value Ref Range    Lactic Acid (POC) 0.8 0.4 - 2.0 mmol/L   CULTURE, BLOOD    Collection Time: 06/06/17  4:03 PM   Result Value Ref Range    Special Requests: NO SPECIAL REQUESTS      Culture result: NO GROWTH AFTER 15 HOURS     CBC WITH AUTOMATED DIFF    Collection Time: 06/07/17  4:08 AM   Result Value Ref Range    WBC 6.4 4.6 - 13.2 K/uL    RBC 3.57 (L) 4.20 - 5.30 M/uL    HGB 10.5 (L) 12.0 - 16.0 g/dL    HCT 32.2 (L) 35.0 - 45.0 %    MCV 90.2 74.0 - 97.0 FL    MCH 29.4 24.0 - 34.0 PG    MCHC 32.6 31.0 - 37.0 g/dL    RDW 13.7 11.6 - 14.5 %    PLATELET 630 (H) 351 - 420 K/uL    MPV 10.6 9.2 - 11.8 FL    NEUTROPHILS 44 42 - 75 %    LYMPHOCYTES 49 20 - 51 %    MONOCYTES 6 2 - 9 %    EOSINOPHILS 1 0 - 5 %    BASOPHILS 0 0 - 3 %    ABS. NEUTROPHILS 2.8 1.8 - 8.0 K/UL    ABS. LYMPHOCYTES 3.1 0.8 - 3.5 K/UL    ABS. MONOCYTES 0.4 0 - 1.0 K/UL    ABS. EOSINOPHILS 0.1 0.0 - 0.4 K/UL    ABS. BASOPHILS 0.0 0.0 - 0.06 K/UL    DF MANUAL      PLATELET COMMENTS ADEQUATE PLATELETS      RBC COMMENTS NORMOCYTIC, NORMOCHROMIC             Assesment:   Patient Active Problem List   Diagnosis Code    Acute gastroenteritis K52.9    Normocytic anemia D64.9    BV (bacterial vaginosis) N76.0, B96.89    Adnexal mass N94.9       Plan: 37 YO with adnexal mass, concern for TOA/PID    1.  TOA/PID--> got antibiotics in the ED and is doing better, ready to go home, will D/C with doxycycline x 2 weeks, have follow up US at that time          Robbie Saleem MD  6/7/201711:29 AM

## 2017-06-07 NOTE — DISCHARGE SUMMARY
Gynecology Surgical Discharge Summary     Name: Kit Vigil MRN: [id number]  SSN: [id number]    YOB: 1985  Age: 36 y.o. Sex: female      Admit date: 6/6/2017    Discharge Date: 6/7/2017      Attending Physician: Claudy Castro MD     Admission Diagnoses: PID    Discharge Diagnoses: Active Problems:    * No active hospital problems. [facility] Course: Normal hospital course for this procedure. Significant Diagnostic Studies:   Recent Results (from the past 24 hour(s))   URINALYSIS W/ RFLX MICROSCOPIC    Collection Time: 06/06/17  3:02 PM   Result Value Ref Range    Color YELLOW      Appearance CLEAR      Specific gravity 1.016 1.005 - 1.030      pH (UA) 7.5 5.0 - 8.0      Protein 300 (A) NEG mg/dL    Glucose 500 (A) NEG mg/dL    Ketone NEGATIVE  NEG mg/dL    Bilirubin NEGATIVE  NEG      Blood SMALL (A) NEG      Urobilinogen 0.2 0.2 - 1.0 EU/dL    Nitrites NEGATIVE  NEG      Leukocyte Esterase NEGATIVE  NEG     HCG URINE, QL    Collection Time: 06/06/17  3:02 PM   Result Value Ref Range    HCG urine, Ql. NEGATIVE  NEG     URINE MICROSCOPIC ONLY    Collection Time: 06/06/17  3:02 PM   Result Value Ref Range    WBC NONE 0 - 4 /hpf    RBC 1 to 3 0 - 5 /hpf    Epithelial cells FEW 0 - 5 /lpf    Bacteria FEW (A) NEG /hpf   CBC WITH AUTOMATED DIFF    Collection Time: 06/06/17  3:55 PM   Result Value Ref Range    WBC 6.5 4.6 - 13.2 K/uL    RBC 3.99 (L) 4.20 - 5.30 M/uL    HGB 11.8 (L) 12.0 - 16.0 g/dL    HCT 35.9 35.0 - 45.0 %    MCV 90.0 74.0 - 97.0 FL    MCH 29.6 24.0 - 34.0 PG    MCHC 32.9 31.0 - 37.0 g/dL    RDW 13.6 11.6 - 14.5 %    PLATELET 262 (H) 037 - 420 K/uL    MPV 10.5 9.2 - 11.8 FL    NEUTROPHILS 52 42 - 75 %    LYMPHOCYTES 45 20 - 51 %    MONOCYTES 3 2 - 9 %    EOSINOPHILS 0 0 - 5 %    BASOPHILS 0 0 - 3 %    ABS. NEUTROPHILS 3.4 1.8 - 8.0 K/UL    ABS. LYMPHOCYTES 2.9 0.8 - 3.5 K/UL    ABS. MONOCYTES 0.2 0 - 1.0 K/UL    ABS. EOSINOPHILS 0.0 0.0 - 0.4 K/UL    ABS. BASOPHILS 0.0 0.0 - 0.06 K/UL    DF MANUAL      PLATELET COMMENTS INCREASED PLATELETS      RBC COMMENTS NORMOCYTIC, NORMOCHROMIC     METABOLIC PANEL, BASIC    Collection Time: 06/06/17  3:55 PM   Result Value Ref Range    Sodium 141 136 - 145 mmol/L    Potassium 4.5 3.5 - 5.5 mmol/L    Chloride 104 100 - 108 mmol/L    CO2 30 21 - 32 mmol/L    Anion gap 7 3.0 - 18 mmol/L    Glucose 83 74 - 99 mg/dL    BUN 6 (L) 7.0 - 18 MG/DL    Creatinine 0.72 0.6 - 1.3 MG/DL    BUN/Creatinine ratio 8 (L) 12 - 20      GFR est AA >60 >60 ml/min/1.73m2    GFR est non-AA >60 >60 ml/min/1.73m2    Calcium 9.5 8.5 - 10.1 MG/DL   CULTURE, BLOOD    Collection Time: 06/06/17  3:55 PM   Result Value Ref Range    Special Requests: NO SPECIAL REQUESTS      Culture result: NO GROWTH AFTER 15 HOURS     POC LACTIC ACID    Collection Time: 06/06/17  3:58 PM   Result Value Ref Range    Lactic Acid (POC) 0.8 0.4 - 2.0 mmol/L   CULTURE, BLOOD    Collection Time: 06/06/17  4:03 PM   Result Value Ref Range    Special Requests: NO SPECIAL REQUESTS      Culture result: NO GROWTH AFTER 15 HOURS     CBC WITH AUTOMATED DIFF    Collection Time: 06/07/17  4:08 AM   Result Value Ref Range    WBC 6.4 4.6 - 13.2 K/uL    RBC 3.57 (L) 4.20 - 5.30 M/uL    HGB 10.5 (L) 12.0 - 16.0 g/dL    HCT 32.2 (L) 35.0 - 45.0 %    MCV 90.2 74.0 - 97.0 FL    MCH 29.4 24.0 - 34.0 PG    MCHC 32.6 31.0 - 37.0 g/dL    RDW 13.7 11.6 - 14.5 %    PLATELET 867 (H) 107 - 420 K/uL    MPV 10.6 9.2 - 11.8 FL    NEUTROPHILS 44 42 - 75 %    LYMPHOCYTES 49 20 - 51 %    MONOCYTES 6 2 - 9 %    EOSINOPHILS 1 0 - 5 %    BASOPHILS 0 0 - 3 %    ABS. NEUTROPHILS 2.8 1.8 - 8.0 K/UL    ABS. LYMPHOCYTES 3.1 0.8 - 3.5 K/UL    ABS. MONOCYTES 0.4 0 - 1.0 K/UL    ABS. EOSINOPHILS 0.1 0.0 - 0.4 K/UL    ABS.  BASOPHILS 0.0 0.0 - 0.06 K/UL    DF MANUAL      PLATELET COMMENTS ADEQUATE PLATELETS      RBC COMMENTS NORMOCYTIC, NORMOCHROMIC         Patient Instructions:   Current Discharge Medication List      START taking these medications    Details   ibuprofen (MOTRIN) 800 mg tablet Take 1 Tab by mouth every eight (8) hours as needed. Indications: Pain  Qty: 60 Tab, Refills: 3      doxycycline (MONODOX) 100 mg capsule Take 1 Cap by mouth two (2) times a day for 14 days. Qty: 28 Cap, Refills: 0         CONTINUE these medications which have NOT CHANGED    Details   sertraline (ZOLOFT) 100 mg tablet Take  by mouth daily. hydrOXYzine (VISTARIL) 50 mg capsule Take 100 mg by mouth three (3) times daily as needed for Itching. traZODone (DESYREL) 100 mg tablet Take 100 mg by mouth nightly. Activity: No sex, douching, or tampons for 6 weeks or as directed by your physician. No heavy lifting for 6 weeks. No driving while taking pain medication. Diet: Resume pre-hospital diet    Follow-up with Roderick Butcher MD in 2 weeks.         Signed By:  Gabrielle Pham MD     June 7, 2017 11:51 AM

## 2017-06-07 NOTE — DISCHARGE INSTRUCTIONS
DISCHARGE SUMMARY from Nurse    The following personal items are in your possession at time of discharge:    Dental Appliances: None  Visual Aid: None     Home Medications: None  Jewelry: Earrings  Clothing: Socks, Undergarments, Footwear, Shirt, With patient  Other Valuables: Cell Phone (with )             PATIENT INSTRUCTIONS:    After general anesthesia or intravenous sedation, for 24 hours or while taking prescription Narcotics:  · Limit your activities  · Do not drive and operate hazardous machinery  · Do not make important personal or business decisions  · Do  not drink alcoholic beverages  · If you have not urinated within 8 hours after discharge, please contact your surgeon on call. Report the following to your surgeon:  · Excessive pain, swelling, redness or odor of or around the surgical area  · Temperature over 100.5  · Nausea and vomiting lasting longer than 4 hours or if unable to take medications  · Any signs of decreased circulation or nerve impairment to extremity: change in color, persistent  numbness, tingling, coldness or increase pain  · Any questions        What to do at Home:  Recommended activity: Activity as tolerated, nausea, vomiting, diarrhea, constipation shortness of breath, increased  pain, any problems or concerns    If you experience any of the following symptoms fever 101 or greater, nausea, vomiting, diarrhea, shortness of breath,  Increased abdominal pain, any problems or concerns. *  Please give a list of your current medications to your Primary Care Provider. *  Please update this list whenever your medications are discontinued, doses are      changed, or new medications (including over-the-counter products) are added. *  Please carry medication information at all times in case of emergency situations.           These are general instructions for a healthy lifestyle:    No smoking/ No tobacco products/ Avoid exposure to second hand smoke    Surgeon Willian Ruiz Warning:  Quitting smoking now greatly reduces serious risk to your health. Obesity, smoking, and sedentary lifestyle greatly increases your risk for illness    A healthy diet, regular physical exercise & weight monitoring are important for maintaining a healthy lifestyle    You may be retaining fluid if you have a history of heart failure or if you experience any of the following symptoms:  Weight gain of 3 pounds or more overnight or 5 pounds in a week, increased swelling in our hands or feet or shortness of breath while lying flat in bed. Please call your doctor as soon as you notice any of these symptoms; do not wait until your next office visit. Recognize signs and symptoms of STROKE:    F-face looks uneven    A-arms unable to move or move unevenly    S-speech slurred or non-existent    T-time-call 911 as soon as signs and symptoms begin-DO NOT go       Back to bed or wait to see if you get better-TIME IS BRAIN. Warning Signs of HEART ATTACK     Call 911 if you have these symptoms:   Chest discomfort. Most heart attacks involve discomfort in the center of the chest that lasts more than a few minutes, or that goes away and comes back. It can feel like uncomfortable pressure, squeezing, fullness, or pain.  Discomfort in other areas of the upper body. Symptoms can include pain or discomfort in one or both arms, the back, neck, jaw, or stomach.  Shortness of breath with or without chest discomfort.  Other signs may include breaking out in a cold sweat, nausea, or lightheadedness. Don't wait more than five minutes to call 911 - MINUTES MATTER! Fast action can save your life. Calling 911 is almost always the fastest way to get lifesaving treatment. Emergency Medical Services staff can begin treatment when they arrive -- up to an hour sooner than if someone gets to the hospital by car. The discharge information has been reviewed with the patient.   The patient verbalized understanding. Discharge medications reviewed with the patient and appropriate educational materials and side effects teaching were provided. Patient armband removed and shredded  MyChart Activation    Thank you for requesting access to [employer]. Please follow the instructions below to securely access and download your online medical record. [employer] allows you to send messages to your doctor, view your test results, renew your prescriptions, schedule appointments, and more. How Do I Sign Up? 1. In your internet browser, go to [email or web address]  2. Click on the First Time User? Click Here link in the Sign In box. You will be redirect to the New Member Sign Up page. 3. Enter your [employer] Access Code exactly as it appears below. You will not need to use this code after youve completed the sign-up process. If you do not sign up before the expiration date, you must request a new code. [employer] Access Code: [email or web address]  Expires: 9/3/2017  4:26 PM (This is the date your [employer] access code will )    4. Enter the last four digits of your Social Security Number (xxxx) and Date of Birth (mm/dd/yyyy) as indicated and click Submit. You will be taken to the next sign-up page. 5. Create a [employer] ID. This will be your [employer] login ID and cannot be changed, so think of one that is secure and easy to remember. 6. Create a [employer] password. You can change your password at any time. 7. Enter your Password Reset Question and Answer. This can be used at a later time if you forget your password. 8. Enter your e-mail address. You will receive e-mail notification when new information is available in [id number]. 9. Click Sign Up. You can now view and download portions of your medical record. 10. Click the Download Summary menu link to download a portable copy of your medical information.     Additional Information    If you have questions, please visit the Frequently Asked Questions section of the [employer] website at [email or web address] Remember, [employer] is NOT to be used for urgent needs. For medical emergencies, dial 911.

## 2017-06-12 LAB
BACTERIA SPEC CULT: NORMAL
BACTERIA SPEC CULT: NORMAL
SERVICE CMNT-IMP: NORMAL
SERVICE CMNT-IMP: NORMAL

## 2017-09-10 ENCOUNTER — HOSPITAL ENCOUNTER (EMERGENCY)
Age: 32
Discharge: HOME | End: 2017-09-10
Attending: EMERGENCY MEDICINE
Payer: MEDICAID

## 2017-09-10 ENCOUNTER — APPOINTMENT (OUTPATIENT)
Dept: CT IMAGING | Age: 32
End: 2017-09-10
Attending: EMERGENCY MEDICINE
Payer: MEDICAID

## 2017-09-10 VITALS
SYSTOLIC BLOOD PRESSURE: 115 MMHG | BODY MASS INDEX: 23.25 KG/M2 | DIASTOLIC BLOOD PRESSURE: 74 MMHG | OXYGEN SATURATION: 99 % | HEART RATE: 112 BPM | RESPIRATION RATE: 16 BRPM | TEMPERATURE: 98.2 F | HEIGHT: 69 IN | WEIGHT: 157 LBS

## 2017-09-10 DIAGNOSIS — N70.93 PYOSALPINX: Primary | ICD-10-CM

## 2017-09-10 LAB
ALBUMIN SERPL-MCNC: 3.6 G/DL (ref 3.4–5)
ALBUMIN/GLOB SERPL: 0.7 {RATIO} (ref 0.8–1.7)
ALP SERPL-CCNC: 79 U/L (ref 45–117)
ALT SERPL-CCNC: 17 U/L (ref 13–56)
ANION GAP SERPL CALC-SCNC: 10 MMOL/L (ref 3–18)
APPEARANCE UR: CLEAR
AST SERPL-CCNC: 17 U/L (ref 15–37)
BASOPHILS # BLD: 0 K/UL (ref 0–0.1)
BASOPHILS NFR BLD: 0 % (ref 0–3)
BILIRUB SERPL-MCNC: 0.6 MG/DL (ref 0.2–1)
BILIRUB UR QL: NEGATIVE
BUN SERPL-MCNC: 6 MG/DL (ref 7–18)
BUN/CREAT SERPL: 6 (ref 12–20)
CALCIUM SERPL-MCNC: 9.5 MG/DL (ref 8.5–10.1)
CHLORIDE SERPL-SCNC: 99 MMOL/L (ref 100–108)
CO2 SERPL-SCNC: 31 MMOL/L (ref 21–32)
COLOR UR: YELLOW
CREAT SERPL-MCNC: 0.99 MG/DL (ref 0.6–1.3)
DIFFERENTIAL METHOD BLD: ABNORMAL
EOSINOPHIL # BLD: 0 K/UL (ref 0–0.4)
EOSINOPHIL NFR BLD: 0 % (ref 0–5)
ERYTHROCYTE [DISTWIDTH] IN BLOOD BY AUTOMATED COUNT: 13.5 % (ref 11.6–14.5)
GLOBULIN SER CALC-MCNC: 4.9 G/DL (ref 2–4)
GLUCOSE SERPL-MCNC: 114 MG/DL (ref 74–99)
GLUCOSE UR STRIP.AUTO-MCNC: NEGATIVE MG/DL
HCG UR QL: NEGATIVE
HCT VFR BLD AUTO: 41.9 % (ref 35–45)
HGB BLD-MCNC: 13.9 G/DL (ref 12–16)
HGB UR QL STRIP: NEGATIVE
KETONES UR QL STRIP.AUTO: NEGATIVE MG/DL
LEUKOCYTE ESTERASE UR QL STRIP.AUTO: NEGATIVE
LIPASE SERPL-CCNC: 69 U/L (ref 73–393)
LYMPHOCYTES # BLD: 1.9 K/UL (ref 0.8–3.5)
LYMPHOCYTES NFR BLD: 16 % (ref 20–51)
MCH RBC QN AUTO: 29.6 PG (ref 24–34)
MCHC RBC AUTO-ENTMCNC: 33.2 G/DL (ref 31–37)
MCV RBC AUTO: 89.1 FL (ref 74–97)
MONOCYTES # BLD: 1.7 K/UL (ref 0–1)
MONOCYTES NFR BLD: 14 % (ref 2–9)
NEUTS BAND NFR BLD MANUAL: 2 % (ref 0–5)
NEUTS SEG # BLD: 8 K/UL (ref 1.8–8)
NEUTS SEG NFR BLD: 68 % (ref 42–75)
NITRITE UR QL STRIP.AUTO: NEGATIVE
PH UR STRIP: 6 [PH] (ref 5–8)
PLATELET # BLD AUTO: 340 K/UL (ref 135–420)
PMV BLD AUTO: 11 FL (ref 9.2–11.8)
POTASSIUM SERPL-SCNC: 4.2 MMOL/L (ref 3.5–5.5)
PROT SERPL-MCNC: 8.5 G/DL (ref 6.4–8.2)
PROT UR STRIP-MCNC: NEGATIVE MG/DL
RBC # BLD AUTO: 4.7 M/UL (ref 4.2–5.3)
RBC MORPH BLD: ABNORMAL
SERVICE CMNT-IMP: NORMAL
SODIUM SERPL-SCNC: 140 MMOL/L (ref 136–145)
SP GR UR REFRACTOMETRY: 1.02 (ref 1–1.03)
UROBILINOGEN UR QL STRIP.AUTO: 1 EU/DL (ref 0.2–1)
WBC # BLD AUTO: 11.8 K/UL (ref 4.6–13.2)
WBC MORPH BLD: ABNORMAL
WET PREP GENITAL: NORMAL

## 2017-09-10 PROCEDURE — 74011000258 HC RX REV CODE- 258: Performed by: EMERGENCY MEDICINE

## 2017-09-10 PROCEDURE — 74011000250 HC RX REV CODE- 250: Performed by: EMERGENCY MEDICINE

## 2017-09-10 PROCEDURE — 96376 TX/PRO/DX INJ SAME DRUG ADON: CPT

## 2017-09-10 PROCEDURE — 74011250636 HC RX REV CODE- 250/636: Performed by: EMERGENCY MEDICINE

## 2017-09-10 PROCEDURE — 96365 THER/PROPH/DIAG IV INF INIT: CPT

## 2017-09-10 PROCEDURE — 87491 CHLMYD TRACH DNA AMP PROBE: CPT | Performed by: EMERGENCY MEDICINE

## 2017-09-10 PROCEDURE — 81003 URINALYSIS AUTO W/O SCOPE: CPT | Performed by: EMERGENCY MEDICINE

## 2017-09-10 PROCEDURE — 96367 TX/PROPH/DG ADDL SEQ IV INF: CPT

## 2017-09-10 PROCEDURE — 81025 URINE PREGNANCY TEST: CPT | Performed by: EMERGENCY MEDICINE

## 2017-09-10 PROCEDURE — 80053 COMPREHEN METABOLIC PANEL: CPT | Performed by: EMERGENCY MEDICINE

## 2017-09-10 PROCEDURE — 74011250637 HC RX REV CODE- 250/637: Performed by: EMERGENCY MEDICINE

## 2017-09-10 PROCEDURE — 83690 ASSAY OF LIPASE: CPT | Performed by: EMERGENCY MEDICINE

## 2017-09-10 PROCEDURE — 99284 EMERGENCY DEPT VISIT MOD MDM: CPT

## 2017-09-10 PROCEDURE — 85025 COMPLETE CBC W/AUTO DIFF WBC: CPT | Performed by: EMERGENCY MEDICINE

## 2017-09-10 PROCEDURE — 74177 CT ABD & PELVIS W/CONTRAST: CPT

## 2017-09-10 PROCEDURE — 96375 TX/PRO/DX INJ NEW DRUG ADDON: CPT

## 2017-09-10 PROCEDURE — 74011636320 HC RX REV CODE- 636/320: Performed by: EMERGENCY MEDICINE

## 2017-09-10 PROCEDURE — 87210 SMEAR WET MOUNT SALINE/INK: CPT | Performed by: EMERGENCY MEDICINE

## 2017-09-10 RX ORDER — AZITHROMYCIN 250 MG/1
1000 TABLET, FILM COATED ORAL
Status: DISCONTINUED | OUTPATIENT
Start: 2017-09-10 | End: 2017-09-10

## 2017-09-10 RX ORDER — HYDROMORPHONE HYDROCHLORIDE 1 MG/ML
1 INJECTION, SOLUTION INTRAMUSCULAR; INTRAVENOUS; SUBCUTANEOUS ONCE
Status: COMPLETED | OUTPATIENT
Start: 2017-09-10 | End: 2017-09-10

## 2017-09-10 RX ORDER — HYDROCODONE BITARTRATE AND ACETAMINOPHEN 5; 325 MG/1; MG/1
TABLET ORAL
Qty: 12 TAB | Refills: 0 | Status: SHIPPED | OUTPATIENT
Start: 2017-09-10 | End: 2017-12-21

## 2017-09-10 RX ORDER — ONDANSETRON 2 MG/ML
4 INJECTION INTRAMUSCULAR; INTRAVENOUS
Status: COMPLETED | OUTPATIENT
Start: 2017-09-10 | End: 2017-09-10

## 2017-09-10 RX ORDER — DOXYCYCLINE 100 MG/1
100 CAPSULE ORAL
Status: COMPLETED | OUTPATIENT
Start: 2017-09-10 | End: 2017-09-10

## 2017-09-10 RX ORDER — DOXYCYCLINE 100 MG/1
100 CAPSULE ORAL 2 TIMES DAILY
Qty: 20 CAP | Refills: 0 | Status: SHIPPED | OUTPATIENT
Start: 2017-09-10 | End: 2017-12-21

## 2017-09-10 RX ORDER — KETOROLAC TROMETHAMINE 30 MG/ML
30 INJECTION, SOLUTION INTRAMUSCULAR; INTRAVENOUS
Status: DISCONTINUED | OUTPATIENT
Start: 2017-09-10 | End: 2017-09-10

## 2017-09-10 RX ORDER — AMPICILLIN 500 MG/1
500 CAPSULE ORAL
Qty: 40 CAP | Refills: 0 | Status: SHIPPED | OUTPATIENT
Start: 2017-09-10 | End: 2017-12-21

## 2017-09-10 RX ORDER — METRONIDAZOLE 500 MG/1
500 TABLET ORAL 2 TIMES DAILY
Qty: 20 TAB | Refills: 0 | Status: SHIPPED | OUTPATIENT
Start: 2017-09-10 | End: 2017-09-20

## 2017-09-10 RX ORDER — CEFTRIAXONE 250 MG/8ML
250 INJECTION, POWDER, FOR SOLUTION INTRAMUSCULAR; INTRAVENOUS
Status: DISCONTINUED | OUTPATIENT
Start: 2017-09-10 | End: 2017-09-10

## 2017-09-10 RX ORDER — METRONIDAZOLE 500 MG/100ML
500 INJECTION, SOLUTION INTRAVENOUS
Status: COMPLETED | OUTPATIENT
Start: 2017-09-10 | End: 2017-09-10

## 2017-09-10 RX ADMIN — IOPAMIDOL 100 ML: 612 INJECTION, SOLUTION INTRAVENOUS at 03:41

## 2017-09-10 RX ADMIN — HYDROMORPHONE HYDROCHLORIDE 1 MG: 1 INJECTION, SOLUTION INTRAMUSCULAR; INTRAVENOUS; SUBCUTANEOUS at 04:48

## 2017-09-10 RX ADMIN — DOXYCYCLINE HYCLATE 100 MG: 100 CAPSULE ORAL at 07:06

## 2017-09-10 RX ADMIN — ONDANSETRON 4 MG: 2 INJECTION INTRAMUSCULAR; INTRAVENOUS at 02:21

## 2017-09-10 RX ADMIN — HYDROMORPHONE HYDROCHLORIDE 1 MG: 1 INJECTION, SOLUTION INTRAMUSCULAR; INTRAVENOUS; SUBCUTANEOUS at 02:25

## 2017-09-10 RX ADMIN — METRONIDAZOLE 500 MG: 500 INJECTION, SOLUTION INTRAVENOUS at 05:16

## 2017-09-10 RX ADMIN — AMPICILLIN SODIUM AND SULBACTAM SODIUM 1.5 G: 1; .5 INJECTION, POWDER, FOR SOLUTION INTRAMUSCULAR; INTRAVENOUS at 06:19

## 2017-09-10 NOTE — DISCHARGE INSTRUCTIONS
Tubo-Ovarian Abscess: Care Instructions  Your Care Instructions    A tubo-ovarian abscess is a pocket of pus that forms because of an infection in a fallopian tube and ovary. A tubo-ovarian abscess is most often caused by pelvic inflammatory disease (PID). Your doctor will prescribe antibiotics to treat the abscess. A very large abscess or one that does not go away after antibiotic treatment may need to be drained. Sometimes surgery is used to remove the infected tube and ovary. Follow-up care is a key part of your treatment and safety. Be sure to make and go to all appointments, and call your doctor if you are having problems. Its also a good idea to know your test results and keep a list of the medicines you take. How can you care for yourself at home? · Take your antibiotics as directed. Do not stop taking them because you feel better. You need to take the full course of antibiotics. · Rest until you feel better. · Take anti-inflammatory medicines to reduce pain. These include ibuprofen (Advil, Motrin) and naproxen (Aleve). Read and follow all instructions on the label. · Use a hot water bottle or a heating pad set on low for belly pain. · Do not have sex or use tampons (use pads instead) until you have taken all the medicine, your pain is gone, and you feel completely well. · Talk to any sex partners you have had in the past 2 months. They need to be tested and may need to be treated for a sexually transmitted disease (STD). When should you call for help? Call 911 anytime you think you may need emergency care. For example, call if:  · You passed out (lost consciousness). · You have sudden, severe pain in your belly or pelvis. Call your doctor now or seek immediate medical care if:  · You have a new or higher fever. · Your pain gets worse. · You have new belly or pelvic pain.   Watch closely for changes in your health, and be sure to contact your doctor if:  · You are vomiting or cannot keep fluids down. · You think you may be pregnant. · You are not getting better after 2 days (48 hours). Where can you learn more? Go to http://bella-keri.info/. Enter V246 in the search box to learn more about \"Tubo-Ovarian Abscess: Care Instructions. \"  Current as of: October 13, 2016  Content Version: 11.3  © 3412-7076 [employer]. Care instructions adapted under license by [employer] (which disclaims liability or warranty for this information). If you have questions about a medical condition or this instruction, always ask your healthcare professional. [place] [address] any warranty or liability for your use of this information.

## 2017-09-10 NOTE — ED TRIAGE NOTES
Patient presents to ER C/O anterior right lower abdominal pain x 2 to 3 days that has increased in intensity. Patient states she was admitted to the hospital about a month ago for an abcess on there fallopian tube that was treated with antibiotics, states the same pain is back again.

## 2017-09-10 NOTE — ED PROVIDER NOTES
HPI Comments: Seen at: 9/10/2017 2:09 AM    Darline Bailey is a 29 y.o. female with pertinent PMHx of anxiety, depression, tobacco abuse, and adnexal mass, presenting to the ED c/o right lower abdominal pain starting 2-3 days ago. Pain is exacerbated by pressure. Pt was admitted to the hospital about a month ago for right fallopian tube abscess. Original plan was to treat the abscess with surgical intervention. However, because pt responded well to antibiotic, no surgery was performed. Pain resolved after 2-3 days of antibiotic treatment. No previous abdominal surgery. No other acute symptoms or complaints were noted. PCP: None      The history is provided by the patient. Past Medical History:   Diagnosis Date    Adnexal mass 06/05/2017    U/S Result: There is a enlarged, tubular structure within the right adnexa, possibly a dilated fallopian tube. Complex hydrosalpinx or pyosalpinx could be considered. Tubo-ovarian abscess is not entirely excluded.  Anxiety     BV (bacterial vaginosis)     Depression     Emotional instability     Normocytic anemia        No past surgical history on file. No family history on file. Social History     Social History    Marital status:      Spouse name: N/A    Number of children: N/A    Years of education: N/A     Occupational History    Not on file. Social History Main Topics    Smoking status: Current Every Day Smoker     Packs/day: 0.50    Smokeless tobacco: Not on file    Alcohol use No    Drug use: Yes     Special: Marijuana    Sexual activity: Yes     Partners: Male     Other Topics Concern    Not on file     Social History Narrative         ALLERGIES: Shellfish derived    Review of Systems   Constitutional: Negative for fever. Gastrointestinal: Positive for abdominal pain. All other systems reviewed and are negative.       Vitals:    09/10/17 0158   BP: 115/74   Pulse: (!) 112   Resp: 16   Temp: 98.2 °F (36.8 °C)   SpO2: 99%   Weight: 71.2 kg (157 lb)   Height: 5' 9\" (1.753 m)            Physical Exam   Constitutional: She is oriented to person, place, and time. She appears well-developed and well-nourished. No distress. HENT:   Head: Normocephalic. Right Ear: External ear normal.   Left Ear: External ear normal.   Mouth/Throat: No oropharyngeal exudate. Eyes: Conjunctivae and EOM are normal. Pupils are equal, round, and reactive to light. Right eye exhibits no discharge. Left eye exhibits no discharge. No scleral icterus. Neck: Normal range of motion. Neck supple. No JVD present. No tracheal deviation present. No thyromegaly present. Cardiovascular: Normal rate, regular rhythm, normal heart sounds and intact distal pulses. Exam reveals no gallop and no friction rub. No murmur heard. Pulmonary/Chest: Effort normal and breath sounds normal. No stridor. No respiratory distress. She has no wheezes. She has no rales. She exhibits no tenderness. Abdominal: Soft. Bowel sounds are normal. She exhibits no distension and no mass. There is tenderness (moderate RLQ, mild LLQ). There is no rebound and no guarding. Musculoskeletal: Normal range of motion. She exhibits no edema or tenderness. Lymphadenopathy:     She has no cervical adenopathy. Neurological: She is alert and oriented to person, place, and time. She displays normal reflexes. No cranial nerve deficit. She exhibits normal muscle tone. Coordination normal.   Skin: Skin is warm and dry. No rash noted. She is not diaphoretic. No erythema. No pallor. Nursing note and vitals reviewed.        MDM  Number of Diagnoses or Management Options  Pyosalpinx: new and requires workup     Amount and/or Complexity of Data Reviewed  Clinical lab tests: ordered and reviewed  Tests in the radiology section of CPT®: ordered and reviewed    Risk of Complications, Morbidity, and/or Mortality  Presenting problems: moderate  Diagnostic procedures: moderate  Management options: moderate      ED Course       Pelvic Exam  Date/Time: 9/10/2017 6:10 AM  Performed by: attending  Procedure duration:  5 minutes. Documented by:  Amaris Duffy As dictated by:  Dr. Anirudh Mallory assisted by:  Female RN Zoya Neri. Type of exam performed: bimanual and speculum. External genitalia appearance: normal.    Vaginal exam:  discharge. The discharge was yellow. Cervical exam:  no cervical motion tenderness. Bimanual exam:  normal (No adenexal mass). Vitals:  Patient Vitals for the past 12 hrs:   Temp Pulse Resp BP SpO2   09/10/17 0158 98.2 °F (36.8 °C) (!) 112 16 115/74 99 %       Medications Ordered:  Medications   doxycycline (VIBRAMYCIN) capsule 100 mg (not administered)   HYDROmorphone (PF) (DILAUDID) injection 1 mg (1 mg IntraVENous Given 9/10/17 0225)   ondansetron (ZOFRAN) injection 4 mg (4 mg IntraVENous Given 9/10/17 0221)   iopamidol (ISOVUE 300) 61 % contrast injection  mL (100 mL IntraVENous Given 9/10/17 0341)   metroNIDAZOLE (FLAGYL) IVPB premix 500 mg (0 mg IntraVENous IV Completed 9/10/17 0616)   HYDROmorphone (PF) (DILAUDID) injection 1 mg (1 mg IntraVENous Given 9/10/17 0448)   ampicillin-sulbactam (UNASYN) 1.5 g in 0.9% sodium chloride (MBP/ADV) 50 mL MBP (1.5 g IntraVENous New Bag 9/10/17 0619)       Lab Findings:  Recent Results (from the past 12 hour(s))   CBC WITH AUTOMATED DIFF    Collection Time: 09/10/17  2:32 AM   Result Value Ref Range    WBC 11.8 4.6 - 13.2 K/uL    RBC 4.70 4.20 - 5.30 M/uL    HGB 13.9 12.0 - 16.0 g/dL    HCT 41.9 35.0 - 45.0 %    MCV 89.1 74.0 - 97.0 FL    MCH 29.6 24.0 - 34.0 PG    MCHC 33.2 31.0 - 37.0 g/dL    RDW 13.5 11.6 - 14.5 %    PLATELET 459 220 - 785 K/uL    MPV 11.0 9.2 - 11.8 FL    NEUTROPHILS 68 42 - 75 %    BAND NEUTROPHILS 2 0 - 5 %    LYMPHOCYTES 16 (L) 20 - 51 %    MONOCYTES 14 (H) 2 - 9 %    EOSINOPHILS 0 0 - 5 %    BASOPHILS 0 0 - 3 %    ABS.  NEUTROPHILS 8.0 1.8 - 8.0 K/UL    ABS. LYMPHOCYTES 1.9 0.8 - 3.5 K/UL    ABS. MONOCYTES 1.7 (H) 0 - 1.0 K/UL    ABS. EOSINOPHILS 0.0 0.0 - 0.4 K/UL    ABS. BASOPHILS 0.0 0.0 - 0.1 K/UL    RBC COMMENTS NORMOCYTIC, NORMOCHROMIC      WBC COMMENTS REACTIVE LYMPHS      DF MANUAL     LIPASE    Collection Time: 09/10/17  2:32 AM   Result Value Ref Range    Lipase 69 (L) 73 - 910 U/L   METABOLIC PANEL, COMPREHENSIVE    Collection Time: 09/10/17  2:32 AM   Result Value Ref Range    Sodium 140 136 - 145 mmol/L    Potassium 4.2 3.5 - 5.5 mmol/L    Chloride 99 (L) 100 - 108 mmol/L    CO2 31 21 - 32 mmol/L    Anion gap 10 3.0 - 18 mmol/L    Glucose 114 (H) 74 - 99 mg/dL    BUN 6 (L) 7.0 - 18 MG/DL    Creatinine 0.99 0.6 - 1.3 MG/DL    BUN/Creatinine ratio 6 (L) 12 - 20      GFR est AA >60 >60 ml/min/1.73m2    GFR est non-AA >60 >60 ml/min/1.73m2    Calcium 9.5 8.5 - 10.1 MG/DL    Bilirubin, total 0.6 0.2 - 1.0 MG/DL    ALT (SGPT) 17 13 - 56 U/L    AST (SGOT) 17 15 - 37 U/L    Alk.  phosphatase 79 45 - 117 U/L    Protein, total 8.5 (H) 6.4 - 8.2 g/dL    Albumin 3.6 3.4 - 5.0 g/dL    Globulin 4.9 (H) 2.0 - 4.0 g/dL    A-G Ratio 0.7 (L) 0.8 - 1.7     URINALYSIS W/ RFLX MICROSCOPIC    Collection Time: 09/10/17  3:05 AM   Result Value Ref Range    Color YELLOW      Appearance CLEAR      Specific gravity 1.018 1.005 - 1.030      pH (UA) 6.0 5.0 - 8.0      Protein NEGATIVE  NEG mg/dL    Glucose NEGATIVE  NEG mg/dL    Ketone NEGATIVE  NEG mg/dL    Bilirubin NEGATIVE  NEG      Blood NEGATIVE  NEG      Urobilinogen 1.0 0.2 - 1.0 EU/dL    Nitrites NEGATIVE  NEG      Leukocyte Esterase NEGATIVE  NEG     HCG URINE, QL    Collection Time: 09/10/17  3:05 AM   Result Value Ref Range    HCG urine, Ql. NEGATIVE  NEG       X-ray, CT or radiology findings or impressions:  CT ABD PELV W CONT   Final Result      CT ABDOMEN AND PELVIS WITH CONTRAST  Impression: Dilated bilateral fallopian tubes with thickened enhancing walls and associated mildly complex appearing free fluid. Findings are concerning for pyosalpinx. Progress notes, consult notes, or additional procedure notes:  4:30 AM Consult: I discussed care with Dr. Mia Hewitt (OB/gyn). It was a standard discussion including patient history, chief complaint, available diagnostic results, and predicted treatment course. Dr. Lorin Blair recommended outpatient treatment with flagyl, ampicillin, and doxycycline, and to follow up at office in a week. 4:33 AM Pt has been reexamined. Patient has no new complaints, changes, or physical findings. Care plan outlined and precautions discussed. Results were reviewed with the patient. All medications were reviewed with the patient; will d/c home with Norco, flagyl, ampicillin, and doxycycline. All of pt's questions and concerns were addressed. Patient was instructed and agrees to follow up with Dr. Lorin Blair in 1-2 weeks, to establish a PCP, as well as to return to the ED upon further deterioration. Patient is ready to go home. Diagnosis:   1. Pyosalpinx        Disposition: Discharge    Follow-up Information     Follow up With Details Comments Isabella Dillard MD Schedule an appointment as soon as possible for a visit follow up in office in 1-2 weeks [address] [facility] [id number] [facility] Schedule an appointment as soon as possible for a visit As needed, If symptoms worsen [address] [place] [phone] [id number] [facility] EMERGENCY DEPT  As needed, If symptoms worsen [address] [id number] [phone]           Patient's Medications   Start Taking    AMPICILLIN (PRINCIPEN) 500 MG CAPSULE    Take 1 Cap by mouth Before breakfast, lunch, dinner and at bedtime. DOXYCYCLINE (MONODOX) 100 MG CAPSULE    Take 1 Cap by mouth two (2) times a day.     HYDROCODONE-ACETAMINOPHEN (NORCO) 5-325 MG PER TABLET    Take 1-2 tablets PO every 4-6 hours as needed for pain control. If over the counter ibuprofen or acetaminophen was suggested, then only take the vicodin for pain not well controlled with the over the counter medication. METRONIDAZOLE (FLAGYL) 500 MG TABLET    Take 1 Tab by mouth two (2) times a day for 10 days. Continue Taking    HYDROXYZINE (VISTARIL) 50 MG CAPSULE    Take 100 mg by mouth three (3) times daily as needed for Itching. IBUPROFEN (MOTRIN) 800 MG TABLET    Take 1 Tab by mouth every eight (8) hours as needed. Indications: Pain    SERTRALINE (ZOLOFT) 100 MG TABLET    Take  by mouth daily. TRAZODONE (DESYREL) 100 MG TABLET    Take 100 mg by mouth nightly. These Medications have changed    No medications on file   Stop Taking    No medications on file       Scribe Attestation      Natalio Tsai acting as a scribe for and in the presence of Sean Cardenas MD      September 10, 2017 at 2:15 AM       Provider Attestation:      I personally performed the services described in the documentation, reviewed the documentation, as recorded by the scribe in my presence, and it accurately and completely records my words and actions.  September 10, 2017 at 2:15 AM - Sean Cardenas MD

## 2017-09-11 ENCOUNTER — HOSPITAL ENCOUNTER (EMERGENCY)
Age: 32
Discharge: HOME | End: 2017-09-11
Attending: EMERGENCY MEDICINE
Payer: MEDICAID

## 2017-09-11 ENCOUNTER — APPOINTMENT (OUTPATIENT)
Dept: ULTRASOUND IMAGING | Age: 32
End: 2017-09-11
Attending: EMERGENCY MEDICINE
Payer: MEDICAID

## 2017-09-11 VITALS
OXYGEN SATURATION: 100 % | RESPIRATION RATE: 20 BRPM | TEMPERATURE: 98.2 F | DIASTOLIC BLOOD PRESSURE: 68 MMHG | HEIGHT: 69 IN | BODY MASS INDEX: 23.25 KG/M2 | WEIGHT: 157 LBS | SYSTOLIC BLOOD PRESSURE: 108 MMHG | HEART RATE: 98 BPM

## 2017-09-11 DIAGNOSIS — R11.2 NON-INTRACTABLE VOMITING WITH NAUSEA, UNSPECIFIED VOMITING TYPE: ICD-10-CM

## 2017-09-11 DIAGNOSIS — R10.31 ABDOMINAL PAIN, RIGHT LOWER QUADRANT: Primary | ICD-10-CM

## 2017-09-11 LAB
ALBUMIN SERPL-MCNC: 3 G/DL (ref 3.4–5)
ALBUMIN/GLOB SERPL: 0.6 {RATIO} (ref 0.8–1.7)
ALP SERPL-CCNC: 67 U/L (ref 45–117)
ALT SERPL-CCNC: 10 U/L (ref 13–56)
ANION GAP SERPL CALC-SCNC: 7 MMOL/L (ref 3–18)
APPEARANCE UR: CLEAR
AST SERPL-CCNC: 6 U/L (ref 15–37)
BASOPHILS # BLD: 0 K/UL (ref 0–0.06)
BASOPHILS NFR BLD: 0 % (ref 0–3)
BILIRUB SERPL-MCNC: 0.4 MG/DL (ref 0.2–1)
BILIRUB UR QL: NEGATIVE
BUN SERPL-MCNC: 5 MG/DL (ref 7–18)
BUN/CREAT SERPL: 7 (ref 12–20)
C TRACH RRNA SPEC QL NAA+PROBE: NEGATIVE
CALCIUM SERPL-MCNC: 9.3 MG/DL (ref 8.5–10.1)
CHLORIDE SERPL-SCNC: 98 MMOL/L (ref 100–108)
CO2 SERPL-SCNC: 31 MMOL/L (ref 21–32)
COLOR UR: YELLOW
CREAT SERPL-MCNC: 0.74 MG/DL (ref 0.6–1.3)
DIFFERENTIAL METHOD BLD: ABNORMAL
EOSINOPHIL # BLD: 0 K/UL (ref 0–0.4)
EOSINOPHIL NFR BLD: 0 % (ref 0–5)
ERYTHROCYTE [DISTWIDTH] IN BLOOD BY AUTOMATED COUNT: 13.5 % (ref 11.6–14.5)
GLOBULIN SER CALC-MCNC: 4.7 G/DL (ref 2–4)
GLUCOSE SERPL-MCNC: 100 MG/DL (ref 74–99)
GLUCOSE UR STRIP.AUTO-MCNC: NEGATIVE MG/DL
HCG UR QL: NEGATIVE
HCT VFR BLD AUTO: 37.1 % (ref 35–45)
HGB BLD-MCNC: 12.4 G/DL (ref 12–16)
HGB UR QL STRIP: NEGATIVE
KETONES UR QL STRIP.AUTO: NEGATIVE MG/DL
LACTATE BLD-SCNC: 1.8 MMOL/L (ref 0.4–2)
LEUKOCYTE ESTERASE UR QL STRIP.AUTO: NEGATIVE
LYMPHOCYTES # BLD: 1.9 K/UL (ref 0.8–3.5)
LYMPHOCYTES NFR BLD: 17 % (ref 20–51)
MCH RBC QN AUTO: 30 PG (ref 24–34)
MCHC RBC AUTO-ENTMCNC: 33.4 G/DL (ref 31–37)
MCV RBC AUTO: 89.8 FL (ref 74–97)
MONOCYTES # BLD: 1.7 K/UL (ref 0–1)
MONOCYTES NFR BLD: 15 % (ref 2–9)
N GONORRHOEA RRNA SPEC QL NAA+PROBE: NEGATIVE
NEUTS SEG # BLD: 7.7 K/UL (ref 1.8–8)
NEUTS SEG NFR BLD: 68 % (ref 42–75)
NITRITE UR QL STRIP.AUTO: NEGATIVE
PH UR STRIP: 6.5 [PH] (ref 5–8)
PLATELET # BLD AUTO: 322 K/UL (ref 135–420)
PLATELET COMMENTS,PCOM: ABNORMAL
PMV BLD AUTO: 10.6 FL (ref 9.2–11.8)
POTASSIUM SERPL-SCNC: 3.7 MMOL/L (ref 3.5–5.5)
PROT SERPL-MCNC: 7.7 G/DL (ref 6.4–8.2)
PROT UR STRIP-MCNC: NEGATIVE MG/DL
RBC # BLD AUTO: 4.13 M/UL (ref 4.2–5.3)
RBC MORPH BLD: ABNORMAL
SODIUM SERPL-SCNC: 136 MMOL/L (ref 136–145)
SP GR UR REFRACTOMETRY: 1.01 (ref 1–1.03)
SPECIMEN SOURCE: NORMAL
UROBILINOGEN UR QL STRIP.AUTO: 1 EU/DL (ref 0.2–1)
WBC # BLD AUTO: 11.3 K/UL (ref 4.6–13.2)

## 2017-09-11 PROCEDURE — 96376 TX/PRO/DX INJ SAME DRUG ADON: CPT

## 2017-09-11 PROCEDURE — 96375 TX/PRO/DX INJ NEW DRUG ADDON: CPT

## 2017-09-11 PROCEDURE — 76830 TRANSVAGINAL US NON-OB: CPT

## 2017-09-11 PROCEDURE — 81025 URINE PREGNANCY TEST: CPT | Performed by: EMERGENCY MEDICINE

## 2017-09-11 PROCEDURE — 83605 ASSAY OF LACTIC ACID: CPT

## 2017-09-11 PROCEDURE — 81003 URINALYSIS AUTO W/O SCOPE: CPT | Performed by: EMERGENCY MEDICINE

## 2017-09-11 PROCEDURE — 96374 THER/PROPH/DIAG INJ IV PUSH: CPT

## 2017-09-11 PROCEDURE — 74011250636 HC RX REV CODE- 250/636: Performed by: EMERGENCY MEDICINE

## 2017-09-11 PROCEDURE — 80053 COMPREHEN METABOLIC PANEL: CPT | Performed by: EMERGENCY MEDICINE

## 2017-09-11 PROCEDURE — 96361 HYDRATE IV INFUSION ADD-ON: CPT

## 2017-09-11 PROCEDURE — 99283 EMERGENCY DEPT VISIT LOW MDM: CPT

## 2017-09-11 PROCEDURE — 85025 COMPLETE CBC W/AUTO DIFF WBC: CPT | Performed by: EMERGENCY MEDICINE

## 2017-09-11 RX ORDER — ONDANSETRON 4 MG/1
4 TABLET, ORALLY DISINTEGRATING ORAL
Qty: 12 TAB | Refills: 0 | Status: SHIPPED | OUTPATIENT
Start: 2017-09-11 | End: 2017-12-21

## 2017-09-11 RX ORDER — HYDROMORPHONE HYDROCHLORIDE 2 MG/ML
1 INJECTION, SOLUTION INTRAMUSCULAR; INTRAVENOUS; SUBCUTANEOUS ONCE
Status: COMPLETED | OUTPATIENT
Start: 2017-09-11 | End: 2017-09-11

## 2017-09-11 RX ORDER — ONDANSETRON 2 MG/ML
4 INJECTION INTRAMUSCULAR; INTRAVENOUS
Status: COMPLETED | OUTPATIENT
Start: 2017-09-11 | End: 2017-09-11

## 2017-09-11 RX ORDER — HYDROMORPHONE HYDROCHLORIDE 1 MG/ML
1 INJECTION, SOLUTION INTRAMUSCULAR; INTRAVENOUS; SUBCUTANEOUS ONCE
Status: COMPLETED | OUTPATIENT
Start: 2017-09-11 | End: 2017-09-11

## 2017-09-11 RX ADMIN — HYDROMORPHONE HYDROCHLORIDE 1 MG: 2 INJECTION, SOLUTION INTRAMUSCULAR; INTRAVENOUS; SUBCUTANEOUS at 15:53

## 2017-09-11 RX ADMIN — HYDROMORPHONE HYDROCHLORIDE 1 MG: 1 INJECTION, SOLUTION INTRAMUSCULAR; INTRAVENOUS; SUBCUTANEOUS at 13:48

## 2017-09-11 RX ADMIN — HYDROMORPHONE HYDROCHLORIDE 1 MG: 2 INJECTION, SOLUTION INTRAMUSCULAR; INTRAVENOUS; SUBCUTANEOUS at 12:05

## 2017-09-11 RX ADMIN — SODIUM CHLORIDE 1000 ML: 900 INJECTION, SOLUTION INTRAVENOUS at 15:54

## 2017-09-11 RX ADMIN — SODIUM CHLORIDE 1000 ML: 900 INJECTION, SOLUTION INTRAVENOUS at 12:07

## 2017-09-11 RX ADMIN — ONDANSETRON 4 MG: 2 INJECTION INTRAMUSCULAR; INTRAVENOUS at 12:05

## 2017-09-11 NOTE — DISCHARGE INSTRUCTIONS
Abdominal Pain: Care Instructions  Your Care Instructions    Abdominal pain has many possible causes. Some aren't serious and get better on their own in a few days. Others need more testing and treatment. If your pain continues or gets worse, you need to be rechecked and may need more tests to find out what is wrong. You may need surgery to correct the problem. Don't ignore new symptoms, such as fever, nausea and vomiting, urination problems, pain that gets worse, and dizziness. These may be signs of a more serious problem. Your doctor may have recommended a follow-up visit in the next 8 to 12 hours. If you are not getting better, you may need more tests or treatment. The doctor has checked you carefully, but problems can develop later. If you notice any problems or new symptoms, get medical treatment right away. Follow-up care is a key part of your treatment and safety. Be sure to make and go to all appointments, and call your doctor if you are having problems. It's also a good idea to know your test results and keep a list of the medicines you take. How can you care for yourself at home? · Rest until you feel better. · To prevent dehydration, drink plenty of fluids, enough so that your urine is light yellow or clear like water. Choose water and other caffeine-free clear liquids until you feel better. If you have kidney, heart, or liver disease and have to limit fluids, talk with your doctor before you increase the amount of fluids you drink. · If your stomach is upset, eat mild foods, such as rice, dry toast or crackers, bananas, and applesauce. Try eating several small meals instead of two or three large ones. · Wait until 48 hours after all symptoms have gone away before you have spicy foods, alcohol, and drinks that contain caffeine. · Do not eat foods that are high in fat. · Avoid anti-inflammatory medicines such as aspirin, ibuprofen (Advil, Motrin), and naproxen (Aleve).  These can cause stomach upset. Talk to your doctor if you take daily aspirin for another health problem. When should you call for help? Call 911 anytime you think you may need emergency care. For example, call if:  · You passed out (lost consciousness). · You pass maroon or very bloody stools. · You vomit blood or what looks like coffee grounds. · You have new, severe belly pain. Call your doctor now or seek immediate medical care if:  · Your pain gets worse, especially if it becomes focused in one area of your belly. · You have a new or higher fever. · Your stools are black and look like tar, or they have streaks of blood. · You have unexpected vaginal bleeding. · You have symptoms of a urinary tract infection. These may include:  ¨ Pain when you urinate. ¨ Urinating more often than usual.  ¨ Blood in your urine. · You are dizzy or lightheaded, or you feel like you may faint. Watch closely for changes in your health, and be sure to contact your doctor if:  · You are not getting better after 1 day (24 hours). Where can you learn more? Go to http://bellaMontage Studiokeri.info/. Enter B251 in the search box to learn more about \"Abdominal Pain: Care Instructions. \"  Current as of: March 20, 2017  Content Version: 11.3  © 9698-4274 [email or web address]. Care instructions adapted under license by [employer] (which disclaims liability or warranty for this information). If you have questions about a medical condition or this instruction, always ask your healthcare professional. [place] [address] any warranty or liability for your use of this information. Nausea and Vomiting: Care Instructions  Your Care Instructions    When you are nauseated, you may feel weak and sweaty and notice a lot of saliva in your mouth. Nausea often leads to vomiting. Most of the time you do not need to worry about nausea and vomiting, but they can be signs of other illnesses.   Two common causes of nausea and vomiting are stomach flu and food poisoning. Nausea and vomiting from viral stomach flu will usually start to improve within 24 hours. Nausea and vomiting from food poisoning may last from 12 to 48 hours. The doctor has checked you carefully, but problems can develop later. If you notice any problems or new symptoms, get medical treatment right away. Follow-up care is a key part of your treatment and safety. Be sure to make and go to all appointments, and call your doctor if you are having problems. It's also a good idea to know your test results and keep a list of the medicines you take. How can you care for yourself at home? · To prevent dehydration, drink plenty of fluids, enough so that your urine is light yellow or clear like water. Choose water and other caffeine-free clear liquids until you feel better. If you have kidney, heart, or liver disease and have to limit fluids, talk with your doctor before you increase the amount of fluids you drink. · Rest in bed until you feel better. · When you are able to eat, try clear soups, mild foods, and liquids until all symptoms are gone for 12 to 48 hours. Other good choices include dry toast, crackers, cooked cereal, and gelatin dessert, such as Jell-O. When should you call for help? Call 911 anytime you think you may need emergency care. For example, call if:  · You passed out (lost consciousness). Call your doctor now or seek immediate medical care if:  · You have symptoms of dehydration, such as:  ¨ Dry eyes and a dry mouth. ¨ Passing only a little dark urine. ¨ Feeling thirstier than usual.  · You have new or worsening belly pain. · You have a new or higher fever. · You vomit blood or what looks like coffee grounds. Watch closely for changes in your health, and be sure to contact your doctor if:  · You have ongoing nausea and vomiting. · Your vomiting is getting worse. · Your vomiting lasts longer than 2 days.   · You are not getting better as expected. Where can you learn more? Go to http://bella-keri.info/. Enter 25 [id number] in the search box to learn more about \"Nausea and Vomiting: Care Instructions. \"  Current as of: March 20, 2017  Content Version: 11.3  © 1855-5372 [employer]. Care instructions adapted under license by [employer] (which disclaims liability or warranty for this information). If you have questions about a medical condition or this instruction, always ask your healthcare professional. [place] [address] any warranty or liability for your use of this information.

## 2017-09-11 NOTE — ED NOTES
Pt discharged to home. Discharge instructions and medications reviewed. All questions answered prior to departure.   Patient armband removed and shredded

## 2017-09-11 NOTE — H&P
Name: Ayana Oseguera MRN: [id number]    YOB: 1985  Age: 29 y.o. Sex: female        Chief complaint:  Abdominal pain      HPI     1. Abdominal pain--> Pt notes that Severiano Crooked has a tubal infection. \" Notes that approx 1 week ago, started having abdominal pain in her pelvic area. Cannot specify either side, rated 8/10, sharp, constant, nothing made it worse, did not radiate anywhere, went to [facility] 2 days ago, was D/C'ed and given doxycyline, flagyl and ampicillin, started the same day. Feels worse now because her pain is worse, thinks she has had a fever as well. OB History      Para Term  AB Living    2 1 1  1 1    SAB TAB Ectopic Molar Multiple Live Births    1     1         LMP , regular, 7 days    PGyn  History   Sexual Activity    Sexual activity: Yes    Partners: Male    Birth control/ protection: None         Past Medical History:   Diagnosis Date    Adnexal mass 2017    U/S Result: There is a enlarged, tubular structure within the right adnexa, possibly a dilated fallopian tube. Complex hydrosalpinx or pyosalpinx could be considered. Tubo-ovarian abscess is not entirely excluded.  Anxiety     BV (bacterial vaginosis)     Depression     Emotional instability     Normocytic anemia        History reviewed. No pertinent surgical history. Allergies   Allergen Reactions    Shellfish Derived Anaphylaxis       No current facility-administered medications on file prior to encounter. Current Outpatient Prescriptions on File Prior to Encounter   Medication Sig Dispense Refill    doxycycline (MONODOX) 100 mg capsule Take 1 Cap by mouth two (2) times a day. 20 Cap 0    ampicillin (PRINCIPEN) 500 mg capsule Take 1 Cap by mouth Before breakfast, lunch, dinner and at bedtime. 40 Cap 0    metroNIDAZOLE (FLAGYL) 500 mg tablet Take 1 Tab by mouth two (2) times a day for 10 days.  20 Tab 0    HYDROcodone-acetaminophen (NORCO) 5-325 mg per tablet Take 1-2 tablets PO every 4-6 hours as needed for pain control. If over the counter ibuprofen or acetaminophen was suggested, then only take the vicodin for pain not well controlled with the over the counter medication. 12 Tab 0    ibuprofen (MOTRIN) 800 mg tablet Take 1 Tab by mouth every eight (8) hours as needed. Indications: Pain 60 Tab 3                            Social History     Social History    Marital status:      Spouse name: N/A    Number of children: N/A    Years of education: N/A     Occupational History    Not on file. Social History Main Topics    Smoking status: Current Every Day Smoker     Packs/day: 1.50     Types: Cigarettes    Smokeless tobacco: Never Used    Alcohol use No    Drug use: Yes     Special: Marijuana    Sexual activity: Yes     Partners: Male     Birth control/ protection: None     Other Topics Concern    Not on file     Social History Narrative       Family History   Problem Relation Age of Onset    Hypertension Maternal Grandmother     Diabetes Paternal Grandmother     Diabetes Maternal Aunt     Diabetes Paternal Uncle     Hypertension Maternal Uncle        ROS fever off/on, not sure how high, + N/V, + diarrhea, N/V/D started approx 1 week ago        Visit Vitals    /65 (BP 1 Location: Left arm, BP Patient Position: At rest)    Pulse (!) 105    Temp 98.8 °F (37.1 °C)    Resp 18    Ht 5' 9\" (1.753 m)    Wt 71.2 kg (157 lb)    LMP 08/11/2017 (Approximate)    SpO2 100%    BMI 23.18 kg/m2       GENERAL:  Well developed, well nourished, in no distress  NEURO/PSYCHE: Grossly intact, normal mood and affect  HEENT: Normal cephalic, atraumatic, good dentition, neck supple.  No thyromegaly  CV: regular rate and rhythm  LUNGS: clear to auscultation bilaterally, no wheezes, rhonchi or rales, good air entry with normal effort  ABDOMEN: + BS, soft without tenderness across her lower abdomen, no guarding, rebound or masses  EXTREMITIES: no edema or erythema noted  SKIN:  Warm, dry, no lesions  LYMPHATICS: No supraclavicular or inguinal nodes noted    PELVIC EXAM:  LABIA MAJORA: no masses, tenderness or lesions  LABIA MINORA: no masses, tenderness or lesions  CLITORIS: no masses, tenderness or lesions  URETHRA: normal appearing, no masses or tenderness  BLADDER: no fullness or tenderness  VAGINA: pink appearing vagina with physiologic discharge, no lesions   PERINEUM: no masses, tenderness or lesions  CERVIX: Uncomfortable with exam  UTERUS: small, mobile, tender B  ADNEXA: nontender and no masses      Recent Results (from the past 24 hour(s))   POC LACTIC ACID    Collection Time: 09/11/17 11:53 AM   Result Value Ref Range    Lactic Acid (POC) 1.8 0.4 - 2.0 mmol/L   CBC WITH AUTOMATED DIFF    Collection Time: 09/11/17 11:56 AM   Result Value Ref Range    WBC 11.3 4.6 - 13.2 K/uL    RBC 4.13 (L) 4.20 - 5.30 M/uL    HGB 12.4 12.0 - 16.0 g/dL    HCT 37.1 35.0 - 45.0 %    MCV 89.8 74.0 - 97.0 FL    MCH 30.0 24.0 - 34.0 PG    MCHC 33.4 31.0 - 37.0 g/dL    RDW 13.5 11.6 - 14.5 %    PLATELET 840 027 - 301 K/uL    MPV 10.6 9.2 - 11.8 FL    NEUTROPHILS 68 42 - 75 %    LYMPHOCYTES 17 (L) 20 - 51 %    MONOCYTES 15 (H) 2 - 9 %    EOSINOPHILS 0 0 - 5 %    BASOPHILS 0 0 - 3 %    ABS. NEUTROPHILS 7.7 1.8 - 8.0 K/UL    ABS. LYMPHOCYTES 1.9 0.8 - 3.5 K/UL    ABS. MONOCYTES 1.7 (H) 0 - 1.0 K/UL    ABS. EOSINOPHILS 0.0 0.0 - 0.4 K/UL    ABS.  BASOPHILS 0.0 0.0 - 0.06 K/UL    DF MANUAL      PLATELET COMMENTS ADEQUATE PLATELETS      RBC COMMENTS NORMOCYTIC, NORMOCHROMIC     METABOLIC PANEL, COMPREHENSIVE    Collection Time: 09/11/17 11:56 AM   Result Value Ref Range    Sodium 136 136 - 145 mmol/L    Potassium 3.7 3.5 - 5.5 mmol/L    Chloride 98 (L) 100 - 108 mmol/L    CO2 31 21 - 32 mmol/L    Anion gap 7 3.0 - 18 mmol/L    Glucose 100 (H) 74 - 99 mg/dL    BUN 5 (L) 7.0 - 18 MG/DL    Creatinine 0.74 0.6 - 1.3 MG/DL    BUN/Creatinine ratio 7 (L) 12 - 20      GFR est AA >60 >60 ml/min/1.73m2    GFR est non-AA >60 >60 ml/min/1.73m2    Calcium 9.3 8.5 - 10.1 MG/DL    Bilirubin, total 0.4 0.2 - 1.0 MG/DL    ALT (SGPT) 10 (L) 13 - 56 U/L    AST (SGOT) 6 (L) 15 - 37 U/L    Alk. phosphatase 67 45 - 117 U/L    Protein, total 7.7 6.4 - 8.2 g/dL    Albumin 3.0 (L) 3.4 - 5.0 g/dL    Globulin 4.7 (H) 2.0 - 4.0 g/dL    A-G Ratio 0.6 (L) 0.8 - 1.7       US    FINDINGS:     Uterus in normal anteverted position. Uterus measures 8.00 cm in length, 3.94 cm  in height and 5.93 cm transverse.     The uterine cervix is closed, measuring 3.87 cm in length.     In the small subcentimeter nabothian cyst in the mid posterior portion of cervix  of uterus.     Endometrial stripe thickness is 1.3 cm. There is no abnormal fluid collection  within endometrial cavity.     Right ovary measures 2.45 x 4.07 x 3.73 cm with a volume of 19.48 mL.     The left ovary measures 2.92 x 2.08 x 1.44 cm with a volume of 4.57 mL.        There is normal vascular flow in right ovary, around the cyst.     Within right ovary there is a complex cyst which is moderate internal  echogenicity. The cyst measures 1.91 cm x 1.69 cm x 2.12 cm which appears to be  new as compared to previous study.     In right adnexal area, medial to right ovary there is a complex oblong cystic  structure measuring 5.1 cm vertically, 2.34 cm transverse and 2.25 centimeter  AP, as compared to previous measurements of 4.1 cm x 2.53 cm x 2.70 cm. This  cystic structure appears to have considerably thick wall. Within the cystic duct  and there is moderate diffuse echogenicity but as compared to previous study is  internal echogenicity is decreased to some extent. On the CT scan of abdomen and  pelvis obtained on 9/10/2017, these tortuous oblong cystic structure has been  visualized and appears to represent hydrosalpinx.  In fact on the CT scan there  is also evidence of left-sided hydrosalpinx.     The left ovary appears to be normal with normal vascular flow.     On the CT scan on 9/10/2017, there is evidence of left-sided hydrosalpinx with a  maximal diameter of 2.05 cm. On the ultrasound study the left-sided hydrosalpinx  as not clearly visualized because of high location as visualized on the CT scan.     There is small to moderate amount of fairly clear fluid in the pelvic  cul-de-sac.  ---------------------------------------  IMPRESSION  IMPRESSION:     Normal uterus.     A complex right ovarian cyst with a maximal diameter of 2.12 cm. The cyst is  probably hemorrhagic cyst. In rest of right ovary there is normal vascular flow  demonstrated.     In the right adnexal area there is tortuous complex cystic tubular structure,  consistent with hydrosalpinx.     On the CT scan is also evidence of left-sided hydrosalpinx extended to left side  of upper pelvis.     Small to moderate fluid in pelvic cul-de-sac. 30 YO  with abdominal pain, N/V/D, R ovarian cyst, B hydrosalpinx    1. Abdominal pain with N/V/D--> Noted that this started last week, maybe have some type of GI illness, no fever today, no white count, no rebound or guarding, reviewed that she does not need to take the antibiotics and they may exacerbate her GI symptoms. Pt stated that if she get worse and comes back to the ED in the next 1-2 days, she will litzy me so she will need my office contact info. 2. R ovarian cyst--> most consistent with a hemorrhagic cyst, has norco from previous ED visit    3.  B hydrosalpinx--> Reviewed on CT and US with radiologist, FF hypoechoic, no stranding or inflammation suggesting infection    F/U weeks

## 2017-09-11 NOTE — ED PROVIDER NOTES
HPI Comments: Tico Long is a 29 y.o. Female with PMHx of anxiety who presents to the ED with c/o worsening abdominal pain. Patient reports she was seen at [place] [address] ED 2 days ago and diagnosed with pyosalpinx. Reports she was seen in [facility] on 6/5 and diagnosed with tubo-ovarian abscess in R fallopian tube, which has returned. Claims she has been taking her prescribed abx for the past 2 days and hydrocodone for pain without relief of sx. Associated symptoms include nausea, vomiting and diarrhea. Denies dysuria or hematuria. Denies recent abdominal surgeries. NKDA. Reports US and CT administered for sx in the past. No other symptoms or concerns were expressed. The history is provided by the patient. Past Medical History:   Diagnosis Date    Adnexal mass 06/05/2017    U/S Result: There is a enlarged, tubular structure within the right adnexa, possibly a dilated fallopian tube. Complex hydrosalpinx or pyosalpinx could be considered. Tubo-ovarian abscess is not entirely excluded.  Anxiety     BV (bacterial vaginosis)     Depression     Emotional instability     Normocytic anemia        History reviewed. No pertinent surgical history. Family History:   Problem Relation Age of Onset    Hypertension Maternal Grandmother     Diabetes Paternal Grandmother     Diabetes Maternal Aunt     Diabetes Paternal Uncle     Hypertension Maternal Uncle        Social History     Social History    Marital status:      Spouse name: N/A    Number of children: N/A    Years of education: N/A     Occupational History    Not on file.      Social History Main Topics    Smoking status: Current Every Day Smoker     Packs/day: 1.50     Types: Cigarettes    Smokeless tobacco: Never Used    Alcohol use No    Drug use: Yes     Special: Marijuana    Sexual activity: Yes     Partners: Male     Birth control/ protection: None     Other Topics Concern    Not on file     Social History Narrative         ALLERGIES: Shellfish derived    Review of Systems   Constitutional: Negative for fever. HENT: Negative for congestion. Respiratory: Negative for cough and shortness of breath. Cardiovascular: Negative for chest pain and leg swelling. Gastrointestinal: Positive for abdominal pain, diarrhea, nausea and vomiting. Genitourinary: Negative for dysuria, hematuria, vaginal bleeding and vaginal discharge. Musculoskeletal: Negative. Neurological: Negative for speech difficulty and headaches. All other systems reviewed and are negative. Vitals:    09/11/17 1059   BP: 117/65   Pulse: (!) 105   Resp: 18   Temp: 98.8 °F (37.1 °C)   SpO2: 100%   Weight: 71.2 kg (157 lb)   Height: 5' 9\" (1.753 m)            Physical Exam   Constitutional: She is oriented to person, place, and time. Uncomfortable appearing   HENT:   Head: Atraumatic. Eyes: Conjunctivae are normal.   Neck: Neck supple. Cardiovascular: Normal rate, regular rhythm and normal heart sounds. Pulmonary/Chest: Effort normal and breath sounds normal. She exhibits no tenderness. Abdominal: Soft. Bowel sounds are normal. She exhibits no distension. There is generalized tenderness. There is no rebound and no guarding. Musculoskeletal: Normal range of motion. She exhibits no edema or tenderness. Neurological: She is alert and oriented to person, place, and time. No cranial nerve deficit. Skin: Skin is warm and dry. Psychiatric: She has a normal mood and affect. Nursing note and vitals reviewed. MDM  Number of Diagnoses or Management Options  Abdominal pain, right lower quadrant:   Non-intractable vomiting with nausea, unspecified vomiting type:   Diagnosis management comments: Alan Gayle is a 29 y.o. female presenting with worsening abdominal (R>L) pain despite po abx. Pt uncomfortable appearing, no peritoneal signs. Previous work up reviewed.  Iv fluids, pain medications and antiemetics given. Will repeat labs and ultrasound obtained, ct done and reviewed from [facility]. OB/GYN consulted and appreciate their assistance. Repeat abd exams benign. Nausea controlled. Awaiting OB/GYN eval.     ED Course       Procedures      Vitals:  Patient Vitals for the past 12 hrs:   Temp Pulse Resp BP SpO2   09/11/17 1059 98.8 °F (37.1 °C) (!) 105 18 117/65 100 %     X-Ray, CT or other radiology findings or impressions:  Us Transvaginal    Result Date: 9/11/2017  Transvaginal ultrasound study of pelvis: INDICATION: Pelvic pain for several months. History of right adnexal abscess as on previous ultrasound study in June, 2017. TECHNIQUE: Transvaginal grayscale and color Doppler ultrasound imaging. COMPARISON STUDY: Previous pelvic ultrasound on 6/5/2017. FINDINGS: Uterus in normal anteverted position. Uterus measures 8.00 cm in length, 3.94 cm in height and 5.93 cm transverse. The uterine cervix is closed, measuring 3.87 cm in length. In the small subcentimeter nabothian cyst in the mid posterior portion of cervix of uterus. Endometrial stripe thickness is 1.3 cm. There is no abnormal fluid collection within endometrial cavity. Right ovary measures 2.45 x 4.07 x 3.73 cm with a volume of 19.48 mL. The left ovary measures 2.92 x 2.08 x 1.44 cm with a volume of 4.57 mL. There is normal vascular flow in right ovary, around the cyst. Within right ovary there is a complex cyst which is moderate internal echogenicity. The cyst measures 1.91 cm x 1.69 cm x 2.12 cm which appears to be new as compared to previous study. In right adnexal area, medial to right ovary there is a complex oblong cystic structure measuring 5.1 cm vertically, 2.34 cm transverse and 2.25 centimeter AP, as compared to previous measurements of 4.1 cm x 2.53 cm x 2.70 cm. This cystic structure appears to have considerably thick wall.  Within the cystic duct and there is moderate diffuse echogenicity but as compared to previous study is internal echogenicity is decreased to some extent. On the CT scan of abdomen and pelvis obtained on 9/10/2017, these tortuous oblong cystic structure has been visualized and appears to represent hydrosalpinx. In fact on the CT scan there is also evidence of left-sided hydrosalpinx. The left ovary appears to be normal with normal vascular flow. On the CT scan on 9/10/2017, there is evidence of left-sided hydrosalpinx with a maximal diameter of 2.05 cm. On the ultrasound study the left-sided hydrosalpinx as not clearly visualized because of high location as visualized on the CT scan. There is small to moderate amount of fairly clear fluid in the pelvic cul-de-sac. ---------------------------------------    IMPRESSION: Normal uterus. A complex right ovarian cyst with a maximal diameter of 2.12 cm. The cyst is probably hemorrhagic cyst. In rest of right ovary there is normal vascular flow demonstrated. In the right adnexal area there is tortuous complex cystic tubular structure, consistent with hydrosalpinx. On the CT scan is also evidence of left-sided hydrosalpinx extended to left side of upper pelvis. Small to moderate fluid in pelvic cul-de-sac. Progress notes, Consult notes or additional Procedure notes:   Consult:  Discussed care with Dr. Savana Sweeney, OB/GYN. Standard discussion; including history of patients chief complaint, available diagnostic results, and treatment course. Will evaluate patient in ED.  2:59 PM, 9/11/2017     5:24 PM Dr Savana Sweeney has evaluated pt and reviewed imaging. Does not feel c/w TOA at this time. Patient very aggitated and yelling at Dr Savana Sweeney during evaluation. I have discussed with pt as well and she is demanding to leave. Unclear what changed however I have spoken with Dr Savana Sweeney, no indication for admission or further intervention at this time. She discussed with pt recommended completing antibtiotics and I have discharged pt with zofran prn nausea.  Pt tolerated po prior to discharge and abdomen exam benign. Return precautions discussed. Patient stated verbal understanding and agrees with course and plan. Discharged home in stable condition      Diagnosis:   1. Abdominal pain, right lower quadrant    2. Non-intractable vomiting with nausea, unspecified vomiting type        Scribe Attestation      Joanne acting as a scribe for and in the presence of Yolie Thompson MD      September 11, 2017 at 11:36 AM       Provider Attestation:      I personally performed the services described in the documentation, reviewed the documentation, as recorded by the scribe in my presence, and it accurately and completely records my words and actions.  September 11, 2017 at 11:36 AM - Yolie Thompson MD

## 2017-09-12 ENCOUNTER — HOSPITAL ENCOUNTER (EMERGENCY)
Age: 32
Discharge: LEFT BEFORE DISCHARGE | End: 2017-09-12
Attending: EMERGENCY MEDICINE
Payer: MEDICAID

## 2017-09-12 VITALS
HEART RATE: 102 BPM | TEMPERATURE: 98.1 F | RESPIRATION RATE: 20 BRPM | DIASTOLIC BLOOD PRESSURE: 84 MMHG | SYSTOLIC BLOOD PRESSURE: 140 MMHG | OXYGEN SATURATION: 100 %

## 2017-09-12 DIAGNOSIS — R11.2 NAUSEA AND VOMITING, INTRACTABILITY OF VOMITING NOT SPECIFIED, UNSPECIFIED VOMITING TYPE: Primary | ICD-10-CM

## 2017-09-12 DIAGNOSIS — R10.84 ABDOMINAL PAIN, GENERALIZED: ICD-10-CM

## 2017-09-12 PROCEDURE — 99282 EMERGENCY DEPT VISIT SF MDM: CPT

## 2017-09-12 RX ORDER — ONDANSETRON 2 MG/ML
4 INJECTION INTRAMUSCULAR; INTRAVENOUS
Status: DISCONTINUED | OUTPATIENT
Start: 2017-09-12 | End: 2017-09-12

## 2017-09-12 RX ORDER — KETOROLAC TROMETHAMINE 30 MG/ML
30 INJECTION, SOLUTION INTRAMUSCULAR; INTRAVENOUS
Status: DISCONTINUED | OUTPATIENT
Start: 2017-09-12 | End: 2017-09-12

## 2017-09-12 NOTE — ED PROVIDER NOTES
HPI Comments: Cintia Santana is a 29 y.o. Female with PMHx of anxiety who presents to the ED with c/o persistent abdominal pain and N/V preventing her from [address] her prescribed pain meds and antibiotics down. Pt was seen yesterday and eval with US, labs, and by Dr. Vicente Perez in ED, discharged to home to continue treatment, and was given zofran to help keep the meds down. However she reports even with the zofran she continues to vomit, so she cannot get pain relief, and also worried b/c unable to keep down antibiotics. Patient reports she was seen at \[facility]\"" ED 3 days ago and diagnosed with pyosalpinx. Reports she was seen in [facility] on 6/5 and diagnosed with tubo-ovarian abscess in R fallopian tube, which has returned. Denies fever, chills, CP, SOB, vaginal discharge or bleeding, diarrhea, constipation, dysuria or hematuria. Denies recent abdominal surgeries. NKDA. No other symptoms or concerns were expressed. Patient is a 29 y.o. female presenting with abdominal pain. The history is provided by the patient and medical records. Abdominal Pain    Associated symptoms include nausea and vomiting. Pertinent negatives include no fever, no diarrhea, no constipation, no dysuria, no headaches and no chest pain. Past Medical History:   Diagnosis Date    Adnexal mass 06/05/2017    U/S Result: There is a enlarged, tubular structure within the right adnexa, possibly a dilated fallopian tube. Complex hydrosalpinx or pyosalpinx could be considered. Tubo-ovarian abscess is not entirely excluded.  Anxiety     BV (bacterial vaginosis)     Depression     Emotional instability     Normocytic anemia        No past surgical history on file.       Family History:   Problem Relation Age of Onset    Hypertension Maternal Grandmother     Diabetes Paternal Grandmother     Diabetes Maternal Aunt     Diabetes Paternal Uncle     Hypertension Maternal Uncle        Social History     Social History    Marital status:      Spouse name: N/A    Number of children: N/A    Years of education: N/A     Occupational History    Not on file. Social History Main Topics    Smoking status: Current Every Day Smoker     Packs/day: 1.50     Types: Cigarettes    Smokeless tobacco: Never Used    Alcohol use No    Drug use: Yes     Special: Marijuana    Sexual activity: Yes     Partners: Male     Birth control/ protection: None     Other Topics Concern    Not on file     Social History Narrative         ALLERGIES: Shellfish derived    Review of Systems   Constitutional: Negative for chills and fever. HENT: Negative for ear pain, rhinorrhea and sore throat. Eyes: Negative for pain and redness. Respiratory: Negative for cough and shortness of breath. Cardiovascular: Negative for chest pain. Gastrointestinal: Positive for abdominal pain, nausea and vomiting. Negative for constipation and diarrhea. Genitourinary: Negative for dysuria, vaginal bleeding, vaginal discharge and vaginal pain. Skin: Negative. Neurological: Negative for dizziness, weakness, light-headedness and headaches. Psychiatric/Behavioral: Negative. Vitals:    09/12/17 1403   BP: 140/84   Pulse: (!) 102   Resp: 20   Temp: 98.1 °F (36.7 °C)   SpO2: 100%            Physical Exam   Constitutional: She is oriented to person, place, and time. She appears well-developed and well-nourished. HENT:   Head: Normocephalic and atraumatic. Right Ear: Tympanic membrane, external ear and ear canal normal.   Left Ear: Tympanic membrane, external ear and ear canal normal.   Nose: Nose normal.   Mouth/Throat: Oropharynx is clear and moist and mucous membranes are normal.   Eyes: Conjunctivae and EOM are normal. Pupils are equal, round, and reactive to light. Neck: Normal range of motion. Cardiovascular: Normal rate, regular rhythm, normal heart sounds and intact distal pulses. No murmur heard.   Pulmonary/Chest: Effort normal and breath sounds normal.   Abdominal: Soft. Bowel sounds are normal. There is no tenderness. Musculoskeletal: Normal range of motion. Neurological: She is alert and oriented to person, place, and time. Skin: Skin is warm and dry. Psychiatric: She has a normal mood and affect. Her behavior is normal. Judgment and thought content normal.   Nursing note and vitals reviewed. MDM  Number of Diagnoses or Management Options  Diagnosis management comments: Pt primary concern is getting nausea under control to take PO meds. Had long discussion with pt on goals of visit and she states nausea and vomiting control is primary. Will recheck CBC and BMP, give IVF and symptom control, reassess. Alfredito Barth PA-C 4:14 PM     After multiple IV line attempts by nursing and techs pt became angry and demanded that she wanted to leave, began yelling at staff. I attempted to go in to speak with pt and try to discuss her options, including at least discharging her with phenergan rx to help with her nausea, and pt did not want to speak about it. She stated she was leaving and going to another hospital. I attempted to discuss the risks of leaving without completing her care, and she stated she knew them and didn't want to talk to anyone, just wanted to leave. Dr. Vane Alvarado also attempted to have a discussion with pt and was unable to. Pt continued to yell and say she wanted to leave, would not have discussion for options or AMA discussion, left ED with security following.  Alfredito Barth PA-C 5:34 PM        Amount and/or Complexity of Data Reviewed  Clinical lab tests: ordered and reviewed  Tests in the radiology section of CPT®: reviewed  Obtain history from someone other than the patient: yes  Review and summarize past medical records: yes  Discuss the patient with other providers: yes    Risk of Complications, Morbidity, and/or Mortality  Presenting problems: moderate  Diagnostic procedures: moderate  Management options: moderate    Patient Progress  Patient progress: stable    ED Course       Procedures    Labs Reviewed   CBC WITH AUTOMATED DIFF   METABOLIC PANEL, BASIC     Diagnosis: No diagnosis found. Disposition: Eloped. Follow-up Information     None          Patient's Medications   Start Taking    No medications on file   Continue Taking    AMPICILLIN (PRINCIPEN) 500 MG CAPSULE    Take 1 Cap by mouth Before breakfast, lunch, dinner and at bedtime. DOXYCYCLINE (MONODOX) 100 MG CAPSULE    Take 1 Cap by mouth two (2) times a day. HYDROCODONE-ACETAMINOPHEN (NORCO) 5-325 MG PER TABLET    Take 1-2 tablets PO every 4-6 hours as needed for pain control. If over the counter ibuprofen or acetaminophen was suggested, then only take the vicodin for pain not well controlled with the over the counter medication. HYDROXYZINE (VISTARIL) 50 MG CAPSULE    Take 100 mg by mouth three (3) times daily as needed for Itching. IBUPROFEN (MOTRIN) 800 MG TABLET    Take 1 Tab by mouth every eight (8) hours as needed. Indications: Pain    METRONIDAZOLE (FLAGYL) 500 MG TABLET    Take 1 Tab by mouth two (2) times a day for 10 days. ONDANSETRON (ZOFRAN ODT) 4 MG DISINTEGRATING TABLET    Take 1 Tab by mouth every eight (8) hours as needed for Nausea. SERTRALINE (ZOLOFT) 100 MG TABLET    Take  by mouth daily. TRAZODONE (DESYREL) 100 MG TABLET    Take 100 mg by mouth nightly.    These Medications have changed    No medications on file   Stop Taking    No medications on file

## 2017-09-12 NOTE — ED NOTES
Pt states that she would like to leave AMA. Dr Nichole Hernandez at bedside. AMA paperwork for pt to sign.  Pt observed ambulating out of fast track area and refusing to sign AMA paperwork

## 2017-09-12 NOTE — ED TRIAGE NOTES
having abd pain. Continued from previous visit. Seen at [facility] for same.

## 2017-10-17 ENCOUNTER — HOSPITAL ENCOUNTER (EMERGENCY)
Age: 32
Discharge: HOME | End: 2017-10-17
Attending: EMERGENCY MEDICINE
Payer: MEDICAID

## 2017-10-17 VITALS
WEIGHT: 157 LBS | HEIGHT: 69 IN | OXYGEN SATURATION: 100 % | HEART RATE: 88 BPM | BODY MASS INDEX: 23.25 KG/M2 | SYSTOLIC BLOOD PRESSURE: 118 MMHG | DIASTOLIC BLOOD PRESSURE: 73 MMHG | TEMPERATURE: 97.8 F | RESPIRATION RATE: 18 BRPM

## 2017-10-17 DIAGNOSIS — S16.1XXA NECK STRAIN, INITIAL ENCOUNTER: Primary | ICD-10-CM

## 2017-10-17 PROCEDURE — 99283 EMERGENCY DEPT VISIT LOW MDM: CPT

## 2017-10-17 PROCEDURE — 74011250637 HC RX REV CODE- 250/637: Performed by: EMERGENCY MEDICINE

## 2017-10-17 RX ORDER — HYDROCODONE BITARTRATE AND ACETAMINOPHEN 5; 325 MG/1; MG/1
1 TABLET ORAL
Status: COMPLETED | OUTPATIENT
Start: 2017-10-17 | End: 2017-10-17

## 2017-10-17 RX ORDER — IBUPROFEN 800 MG/1
800 TABLET ORAL EVERY 8 HOURS
Qty: 15 TAB | Refills: 0 | Status: SHIPPED | OUTPATIENT
Start: 2017-10-17 | End: 2017-10-22

## 2017-10-17 RX ORDER — CYCLOBENZAPRINE HCL 5 MG
10 TABLET ORAL 3 TIMES DAILY
Qty: 18 TAB | Refills: 0 | Status: SHIPPED | OUTPATIENT
Start: 2017-10-17 | End: 2017-10-20

## 2017-10-17 RX ADMIN — HYDROCODONE BITARTRATE AND ACETAMINOPHEN 1 TABLET: 5; 325 TABLET ORAL at 18:35

## 2017-10-17 NOTE — DISCHARGE INSTRUCTIONS
SPECIFIC PATIENT INSTRUCTIONS FROM THE PHYSICIAN WHO TREATED YOU IN THE ER TODAY:  1. Ibuprofen and flexeril as prescribed until finished. 2. Return if any concerns or worsening condition(s). Return here for increasing pain, change in pain, numbness or tingling in arms and legs. 3. FOLLOW UP APPOINTMENT:  Your primary doctor in 4-5 days for reevaluation. 4. Smoking is an addictive habit. It may be difficult to quit smoking on your own. Seek the help of your primary doctor for assistance and guidance in quitting smoking. Neck Strain: Care Instructions  Your Care Instructions  You have strained the muscles and ligaments in your neck. A sudden, awkward movement can strain the neck. This often occurs with falls or car accidents or during certain sports. Everyday activities like working on a computer or sleeping can also cause neck strain if they force you to hold your neck in an awkward position for a long time. It is common for neck pain to get worse for a day or two after an injury, but it should start to feel better after that. You may have more pain and stiffness for several days before it gets better. This is expected. It may take a few weeks or longer for it to heal completely. Good home treatment can help you get better faster and avoid future neck problems. Follow-up care is a key part of your treatment and safety. Be sure to make and go to all appointments, and call your doctor if you are having problems. It's also a good idea to know your test results and keep a list of the medicines you take. How can you care for yourself at home? · If you were given a neck brace (cervical collar) to limit neck motion, wear it as instructed for as many days as your doctor tells you to. Do not wear it longer than you were told to. Wearing a brace for too long can make neck stiffness worse and weaken the neck muscles. · You can try using heat or ice to see if it helps.   ¨ Try using a heating pad on a low or medium setting for 15 to 20 minutes every 2 to 3 hours. Try a warm shower in place of one session with the heating pad. You can also buy single-use heat wraps that last up to 8 hours. ¨ You can also try an ice pack for 10 to 15 minutes every 2 to 3 hours. · Take pain medicines exactly as directed. ¨ If the doctor gave you a prescription medicine for pain, take it as prescribed. ¨ If you are not taking a prescription pain medicine, ask your doctor if you can take an over-the-counter medicine. · Gently rub the area to relieve pain and help with blood flow. Do not massage the area if it hurts to do so. · Do not do anything that makes the pain worse. Take it easy for a couple of days. You can do your usual activities if they do not hurt your neck or put it at risk for more stress or injury. · Try sleeping on a special neck pillow. Place it under your neck, not under your head. Placing a tightly rolled-up towel under your neck while you sleep will also work. If you use a neck pillow or rolled towel, do not use your regular pillow at the same time. · To prevent future neck pain, do exercises to stretch and strengthen your neck and back. Learn how to use good posture, safe lifting techniques, and proper body mechanics. When should you call for help? Call 911 anytime you think you may need emergency care. For example, call if:  · You are unable to move an arm or a leg at all. Call your doctor now or seek immediate medical care if:  · You have new or worse symptoms in your arms, legs, chest, belly, or buttocks. Symptoms may include:  ¨ Numbness or tingling. ¨ Weakness. ¨ Pain. · You lose bladder or bowel control. Watch closely for changes in your health, and be sure to contact your doctor if:  · You are not getting better as expected. Where can you learn more? Go to http://bella-keri.info/. Enter M253 in the search box to learn more about \"Neck Strain: Care Instructions. \"  Current as of: March 21, 2017  Content Version: 11.3  © 4766-6663 [employer]. Care instructions adapted under license by [employer] (which disclaims liability or warranty for this information). If you have questions about a medical condition or this instruction, always ask your healthcare professional. [address] any warranty or liability for your use of this information.

## 2017-10-17 NOTE — ED PROVIDER NOTES
[facility] EMERGENCY DEPT      29 y.o. female with noted past medical history who presents to the emergency department complaining of neck pain since Sunday morning. The pt report she woke up in the morning with neck pain and stiffness she says she was unable to turn her head to the right but today she says she cant move her neck at all. The pt states her pain is exacerbated with movement. The pt says she attempted to treat the pain with heating pads with no relief. The pt denies back pain, HA, and fever. The pt had no other complaints or concerns in the ED. Nursing nurses regarding the HPI and triage nursing notes were reviewed. No current facility-administered medications for this encounter. Current Outpatient Prescriptions   Medication Sig    ondansetron (ZOFRAN ODT) 4 mg disintegrating tablet Take 1 Tab by mouth every eight (8) hours as needed for Nausea.  doxycycline (MONODOX) 100 mg capsule Take 1 Cap by mouth two (2) times a day.  ampicillin (PRINCIPEN) 500 mg capsule Take 1 Cap by mouth Before breakfast, lunch, dinner and at bedtime.  HYDROcodone-acetaminophen (NORCO) 5-325 mg per tablet Take 1-2 tablets PO every 4-6 hours as needed for pain control. If over the counter ibuprofen or acetaminophen was suggested, then only take the vicodin for pain not well controlled with the over the counter medication.  ibuprofen (MOTRIN) 800 mg tablet Take 1 Tab by mouth every eight (8) hours as needed. Indications: Pain    sertraline (ZOLOFT) 100 mg tablet Take  by mouth daily.  hydrOXYzine (VISTARIL) 50 mg capsule Take 100 mg by mouth three (3) times daily as needed for Itching.  traZODone (DESYREL) 100 mg tablet Take 100 mg by mouth nightly. Past Medical History:   Diagnosis Date    Adnexal mass 06/05/2017    U/S Result: There is a enlarged, tubular structure within the right adnexa, possibly a dilated fallopian tube. Complex hydrosalpinx or pyosalpinx could be considered. Tubo-ovarian abscess is not entirely excluded.  Anxiety     BV (bacterial vaginosis)     Depression     Emotional instability     Normocytic anemia        History reviewed. No pertinent surgical history. Family History   Problem Relation Age of Onset    Hypertension Maternal Grandmother     Diabetes Paternal Grandmother     Diabetes Maternal Aunt     Diabetes Paternal Uncle     Hypertension Maternal Uncle        Social History     Social History    Marital status:      Spouse name: N/A    Number of children: N/A    Years of education: N/A     Occupational History    Not on file. Social History Main Topics    Smoking status: Current Every Day Smoker     Packs/day: 1.50     Types: Cigarettes    Smokeless tobacco: Never Used    Alcohol use No    Drug use: Yes     Special: Marijuana    Sexual activity: Yes     Partners: Male     Birth control/ protection: None     Other Topics Concern    Not on file     Social History Narrative       Allergies   Allergen Reactions    Shellfish Derived Anaphylaxis       Patient's primary care provider (as noted in EPIC):  None    REVIEW OF SYSTEMS:    Constitutional:  Negative for diaphoresis. Eyes:  Negative for diploplia. HENT:  Negative for congestion. Respiratory:  Negative for stridor. Cardiovascular:  Negative for palpitations. Gastrointestinal:  Negative for diarrhea. Genitourinary:  Negative for flank pain. Musculoskeletal:  Negative for back pain. Skin:  Negative for pallor. Neurological:  Negative for weakness. Psychiatric:  Negative for hallucinations. Visit Vitals    /73 (BP 1 Location: Left arm, BP Patient Position: At rest)    Pulse 88    Temp 97.8 °F (36.6 °C)    Resp 18    Ht 5' 9\" (1.753 m)    Wt 71.2 kg (157 lb)    SpO2 100%    BMI 23.18 kg/m2       PHYSICAL EXAM:    CONSTITUTIONAL:  Alert, in no apparent distress;  well developed;  well nourished.   HEAD:  Normocephalic, atraumatic. EYES:  EOMI. Non-icteric sclera. Normal conjunctiva. ENTM:  Nose:  no rhinorrhea. Throat:  no erythema or exudate, mucous membranes moist.    NECK:   Bilateral lateral neck and adjacent medial trapezius focal mild reproducible tenderness to palpation. No rash, lesions, bruising. No JVD. Supple. RESPIRATORY:  Chest clear, equal breath sounds, good air movement. CARDIOVASCULAR:  Regular rate and rhythm. No murmurs, rubs, or gallops. GI:  Normal bowel sounds, abdomen soft and non-tender. No rebound or guarding. BACK:  Grossly normal exam.   UPPER EXT:  Normal inspection. LOWER EXT:  No edema, no calf tenderness. Distal pulses intact. NEURO:  Moves all four extremities, and grossly normal motor exam.  SKIN:  No rashes;  Normal for age. PSYCH:  Alert and normal affect. DIFFERENTIAL DIAGNOSES/ MEDICAL DECISION MAKING:  Neck myofascial strain, spasm, neuropathy pain to include nerve impingement, cervical spine pathology such as spinal stenosis and/or a combination of the aforementioned. Abnormal lab results from this emergency department encounter:  Labs Reviewed - No data to display    Lab values for this patient within approximately the last 12 hours:  No results found for this or any previous visit (from the past 12 hour(s)). Radiologist and cardiologist interpretations if available at time of this note:  No results found. Medication(s) ordered for patient during this emergency visit encounter:  Medications - No data to display    ED COURSE:      IMPRESSION AND MEDICAL DECISION MAKING:  Based upon the patients presentation with noted HPI and PE, along with the work up done in the emergency department, I believe that the patient is having noted myofascial strain. DIAGNOSIS:  1. Neck and trapezius myofascial strain. 2. Cigarette smoker. SPECIFIC PATIENT INSTRUCTIONS FROM THE PHYSICIAN WHO TREATED YOU IN THE ER TODAY:  1.  Ibuprofen and flexeril as prescribed until finished. 2. Return if any concerns or worsening condition(s). Return here for increasing pain, change in pain, numbness or tingling in arms and legs. 3. FOLLOW UP APPOINTMENT:  Your primary doctor in 4-5 days for reevaluation. 4. Smoking is an addictive habit. It may be difficult to quit smoking on your own. Seek the help of your primary doctor for assistance and guidance in quitting smoking. Sid Cassidy M.D. Provider Attestation:  If a scribe was utilized in generation of this patient record, I personally performed the services described in the documentation, reviewed the documentation, as recorded by the scribe in my presence, and it accurately records the patient's history of presenting illness, review of systems, patient physical examination, and procedures performed by me as the attending physician. Sid Cassidy M.D.   [facility] Board Certified Emergency Physician  10/17/2017.  5:55 PM    Scribe [id number] acting as a scribe for and in the presence of Lucius Cancino MD      October 17, 2017 at 6:00 PM

## 2017-12-21 ENCOUNTER — HOSPITAL ENCOUNTER (EMERGENCY)
Age: 32
Discharge: HOME | End: 2017-12-21
Attending: EMERGENCY MEDICINE | Admitting: EMERGENCY MEDICINE
Payer: MEDICAID

## 2017-12-21 ENCOUNTER — APPOINTMENT (OUTPATIENT)
Dept: CT IMAGING | Age: 32
End: 2017-12-21
Attending: PHYSICIAN ASSISTANT
Payer: MEDICAID

## 2017-12-21 VITALS
OXYGEN SATURATION: 100 % | SYSTOLIC BLOOD PRESSURE: 114 MMHG | RESPIRATION RATE: 20 BRPM | DIASTOLIC BLOOD PRESSURE: 75 MMHG | TEMPERATURE: 98.1 F | BODY MASS INDEX: 24.29 KG/M2 | HEART RATE: 95 BPM | HEIGHT: 69 IN | WEIGHT: 164 LBS

## 2017-12-21 DIAGNOSIS — B96.89 BV (BACTERIAL VAGINOSIS): Primary | ICD-10-CM

## 2017-12-21 DIAGNOSIS — N76.0 BV (BACTERIAL VAGINOSIS): Primary | ICD-10-CM

## 2017-12-21 DIAGNOSIS — N70.93 PYOSALPINX: ICD-10-CM

## 2017-12-21 LAB
ALBUMIN SERPL-MCNC: 3.4 G/DL (ref 3.4–5)
ALBUMIN/GLOB SERPL: 0.8 {RATIO} (ref 0.8–1.7)
ALP SERPL-CCNC: 76 U/L (ref 45–117)
ALT SERPL-CCNC: 12 U/L (ref 13–56)
ANION GAP SERPL CALC-SCNC: 6 MMOL/L (ref 3–18)
APPEARANCE UR: ABNORMAL
AST SERPL-CCNC: 12 U/L (ref 15–37)
BACTERIA URNS QL MICRO: ABNORMAL /HPF
BASOPHILS # BLD: 0 K/UL (ref 0–0.1)
BASOPHILS NFR BLD: 0 % (ref 0–2)
BILIRUB SERPL-MCNC: 0.6 MG/DL (ref 0.2–1)
BILIRUB UR QL: NEGATIVE
BUN SERPL-MCNC: 6 MG/DL (ref 7–18)
BUN/CREAT SERPL: 8 (ref 12–20)
CALCIUM SERPL-MCNC: 9.2 MG/DL (ref 8.5–10.1)
CHLORIDE SERPL-SCNC: 101 MMOL/L (ref 100–108)
CO2 SERPL-SCNC: 30 MMOL/L (ref 21–32)
COLOR UR: ABNORMAL
CREAT SERPL-MCNC: 0.76 MG/DL (ref 0.6–1.3)
DIFFERENTIAL METHOD BLD: ABNORMAL
EOSINOPHIL # BLD: 0 K/UL (ref 0–0.4)
EOSINOPHIL NFR BLD: 0 % (ref 0–5)
EPITH CASTS URNS QL MICRO: ABNORMAL /LPF (ref 0–5)
ERYTHROCYTE [DISTWIDTH] IN BLOOD BY AUTOMATED COUNT: 14.4 % (ref 11.6–14.5)
GLOBULIN SER CALC-MCNC: 4.2 G/DL (ref 2–4)
GLUCOSE SERPL-MCNC: 97 MG/DL (ref 74–99)
GLUCOSE UR STRIP.AUTO-MCNC: NEGATIVE MG/DL
HCG UR QL: NEGATIVE
HCT VFR BLD AUTO: 38.6 % (ref 35–45)
HGB BLD-MCNC: 13 G/DL (ref 12–16)
HGB UR QL STRIP: NEGATIVE
KETONES UR QL STRIP.AUTO: 15 MG/DL
LEUKOCYTE ESTERASE UR QL STRIP.AUTO: ABNORMAL
LYMPHOCYTES # BLD: 1.3 K/UL (ref 0.9–3.6)
LYMPHOCYTES NFR BLD: 11 % (ref 21–52)
MCH RBC QN AUTO: 30.3 PG (ref 24–34)
MCHC RBC AUTO-ENTMCNC: 33.7 G/DL (ref 31–37)
MCV RBC AUTO: 90 FL (ref 74–97)
MONOCYTES # BLD: 1.4 K/UL (ref 0.05–1.2)
MONOCYTES NFR BLD: 12 % (ref 3–10)
MUCOUS THREADS URNS QL MICRO: ABNORMAL /LPF
NEUTS SEG # BLD: 9.1 K/UL (ref 1.8–8)
NEUTS SEG NFR BLD: 77 % (ref 40–73)
NITRITE UR QL STRIP.AUTO: NEGATIVE
PH UR STRIP: 5.5 [PH] (ref 5–8)
PLATELET # BLD AUTO: 360 K/UL (ref 135–420)
PMV BLD AUTO: 10.8 FL (ref 9.2–11.8)
POTASSIUM SERPL-SCNC: 3.9 MMOL/L (ref 3.5–5.5)
PROT SERPL-MCNC: 7.6 G/DL (ref 6.4–8.2)
PROT UR STRIP-MCNC: ABNORMAL MG/DL
RBC # BLD AUTO: 4.29 M/UL (ref 4.2–5.3)
RBC #/AREA URNS HPF: ABNORMAL /HPF (ref 0–5)
SERVICE CMNT-IMP: NORMAL
SODIUM SERPL-SCNC: 137 MMOL/L (ref 136–145)
SP GR UR REFRACTOMETRY: 1.03 (ref 1–1.03)
UROBILINOGEN UR QL STRIP.AUTO: 1 EU/DL (ref 0.2–1)
WBC # BLD AUTO: 11.8 K/UL (ref 4.6–13.2)
WBC URNS QL MICRO: ABNORMAL /HPF (ref 0–4)
WET PREP GENITAL: NORMAL

## 2017-12-21 PROCEDURE — 96372 THER/PROPH/DIAG INJ SC/IM: CPT

## 2017-12-21 PROCEDURE — 74011636320 HC RX REV CODE- 636/320: Performed by: EMERGENCY MEDICINE

## 2017-12-21 PROCEDURE — 74177 CT ABD & PELVIS W/CONTRAST: CPT

## 2017-12-21 PROCEDURE — 96374 THER/PROPH/DIAG INJ IV PUSH: CPT

## 2017-12-21 PROCEDURE — 96361 HYDRATE IV INFUSION ADD-ON: CPT

## 2017-12-21 PROCEDURE — 74011250636 HC RX REV CODE- 250/636: Performed by: PHYSICIAN ASSISTANT

## 2017-12-21 PROCEDURE — 74011250637 HC RX REV CODE- 250/637: Performed by: PHYSICIAN ASSISTANT

## 2017-12-21 PROCEDURE — 96376 TX/PRO/DX INJ SAME DRUG ADON: CPT

## 2017-12-21 PROCEDURE — 96375 TX/PRO/DX INJ NEW DRUG ADDON: CPT

## 2017-12-21 PROCEDURE — 87491 CHLMYD TRACH DNA AMP PROBE: CPT

## 2017-12-21 PROCEDURE — 81025 URINE PREGNANCY TEST: CPT | Performed by: PHYSICIAN ASSISTANT

## 2017-12-21 PROCEDURE — 85025 COMPLETE CBC W/AUTO DIFF WBC: CPT | Performed by: PHYSICIAN ASSISTANT

## 2017-12-21 PROCEDURE — 74011000250 HC RX REV CODE- 250: Performed by: PHYSICIAN ASSISTANT

## 2017-12-21 PROCEDURE — 80053 COMPREHEN METABOLIC PANEL: CPT | Performed by: PHYSICIAN ASSISTANT

## 2017-12-21 PROCEDURE — 87210 SMEAR WET MOUNT SALINE/INK: CPT

## 2017-12-21 PROCEDURE — 81001 URINALYSIS AUTO W/SCOPE: CPT | Performed by: PHYSICIAN ASSISTANT

## 2017-12-21 PROCEDURE — 99283 EMERGENCY DEPT VISIT LOW MDM: CPT

## 2017-12-21 RX ORDER — ONDANSETRON 2 MG/ML
4 INJECTION INTRAMUSCULAR; INTRAVENOUS
Status: COMPLETED | OUTPATIENT
Start: 2017-12-21 | End: 2017-12-21

## 2017-12-21 RX ORDER — ONDANSETRON 8 MG/1
8 TABLET, ORALLY DISINTEGRATING ORAL
Qty: 15 TAB | Refills: 0 | Status: SHIPPED | OUTPATIENT
Start: 2017-12-21 | End: 2019-08-12

## 2017-12-21 RX ORDER — KETOROLAC TROMETHAMINE 30 MG/ML
30 INJECTION, SOLUTION INTRAMUSCULAR; INTRAVENOUS
Status: COMPLETED | OUTPATIENT
Start: 2017-12-21 | End: 2017-12-21

## 2017-12-21 RX ORDER — AZITHROMYCIN 250 MG/1
1000 TABLET, FILM COATED ORAL
Status: COMPLETED | OUTPATIENT
Start: 2017-12-21 | End: 2017-12-21

## 2017-12-21 RX ORDER — DOXYCYCLINE 100 MG/1
100 CAPSULE ORAL 2 TIMES DAILY
Qty: 28 CAP | Refills: 0 | Status: SHIPPED | OUTPATIENT
Start: 2017-12-21 | End: 2018-01-04

## 2017-12-21 RX ORDER — METRONIDAZOLE 500 MG/1
500 TABLET ORAL 3 TIMES DAILY
Qty: 21 TAB | Refills: 0 | Status: SHIPPED | OUTPATIENT
Start: 2017-12-21 | End: 2017-12-28

## 2017-12-21 RX ORDER — MORPHINE SULFATE 4 MG/ML
4 INJECTION, SOLUTION INTRAMUSCULAR; INTRAVENOUS
Status: COMPLETED | OUTPATIENT
Start: 2017-12-21 | End: 2017-12-21

## 2017-12-21 RX ORDER — HYDROCODONE BITARTRATE AND ACETAMINOPHEN 5; 325 MG/1; MG/1
1 TABLET ORAL
Qty: 16 TAB | Refills: 0 | Status: SHIPPED | OUTPATIENT
Start: 2017-12-21 | End: 2018-02-15

## 2017-12-21 RX ADMIN — LIDOCAINE HYDROCHLORIDE 250 MG: 10 INJECTION, SOLUTION EPIDURAL; INFILTRATION; INTRACAUDAL; PERINEURAL at 15:03

## 2017-12-21 RX ADMIN — ONDANSETRON 4 MG: 2 SOLUTION INTRAMUSCULAR; INTRAVENOUS at 12:38

## 2017-12-21 RX ADMIN — AZITHROMYCIN 1000 MG: 250 TABLET, FILM COATED ORAL at 15:01

## 2017-12-21 RX ADMIN — MORPHINE SULFATE 4 MG: 4 INJECTION, SOLUTION INTRAMUSCULAR; INTRAVENOUS at 16:08

## 2017-12-21 RX ADMIN — SODIUM CHLORIDE 1000 ML: 900 INJECTION, SOLUTION INTRAVENOUS at 12:38

## 2017-12-21 RX ADMIN — KETOROLAC TROMETHAMINE 30 MG: 30 INJECTION, SOLUTION INTRAMUSCULAR; INTRAVENOUS at 12:38

## 2017-12-21 RX ADMIN — ONDANSETRON 4 MG: 2 SOLUTION INTRAMUSCULAR; INTRAVENOUS at 16:09

## 2017-12-21 RX ADMIN — IOPAMIDOL 100 ML: 612 INJECTION, SOLUTION INTRAVENOUS at 13:25

## 2017-12-21 NOTE — ED NOTES
About a month ago I had a tubal infection that I took all the medication for. Monday I started have abdominal pain that has gotten worse.

## 2017-12-21 NOTE — ED PROVIDER NOTES
HPI Comments: Pt is a 30 yo female with PMH of anxiety, hydro/pyosalpinx, BV, PID, and anemia presents to the ED for pelvic pain, nausea, vomiting, and diarrhea beginning 4 days ago. Per pt the abd pain has been gradually worsening, constant, and now 9/10 lower abd vs pelvic pain, cramping in nature. She notes ongoing nausea with 5 episodes of non-bloody, non-bilious vomiting over the past 4 days. She reports the diarrhea resolved yesterday. She reports this pain feels similar to a few months ago when she had a \"tubal infection\". She reports movement and palpation makes the pain worse. She tried heating pads for the pain without relief. She also reports subjective fever on the first day, but did not check temp. She denies concern for STD at this time. Pt denies HA, dizziness, lightheadedness, CP, SOB, cough, dysuria, vaginal pain, vaginal discharge, vaginal bleeding, or any other complaints at this time. Patient is a 29 y.o. female presenting with abdominal pain. The history is provided by the patient. Abdominal Pain    This is a new problem. The current episode started more than 2 days ago. The problem occurs constantly. The problem has been gradually worsening. The pain is associated with an unknown factor. The pain is located in the suprapubic region, RLQ and LLQ. The quality of the pain is cramping. The pain is at a severity of 9/10. Associated symptoms include a fever, diarrhea, nausea and vomiting. Pertinent negatives include no anorexia, no belching, no flatus, no hematochezia, no melena, no constipation, no dysuria, no frequency, no hematuria, no headaches, no arthralgias, no myalgias, no trauma, no chest pain and no back pain. The pain is worsened by palpation and certain positions. The pain is relieved by nothing. The patient's surgical history non-contributory. Past Medical History:   Diagnosis Date    Adnexal mass 06/05/2017    U/S Result:  There is a enlarged, tubular structure within the right adnexa, possibly a dilated fallopian tube. Complex hydrosalpinx or pyosalpinx could be considered. Tubo-ovarian abscess is not entirely excluded.  Anxiety     BV (bacterial vaginosis)     Depression     Emotional instability     Normocytic anemia        No past surgical history on file. Family History:   Problem Relation Age of Onset    Hypertension Maternal Grandmother     Diabetes Paternal Grandmother     Diabetes Maternal Aunt     Diabetes Paternal Uncle     Hypertension Maternal Uncle        Social History     Social History    Marital status:      Spouse name: N/A    Number of children: N/A    Years of education: N/A     Occupational History    Not on file. Social History Main Topics    Smoking status: Current Every Day Smoker     Packs/day: 1.50     Types: Cigarettes    Smokeless tobacco: Never Used    Alcohol use No    Drug use: Yes     Special: Marijuana    Sexual activity: Yes     Partners: Male     Birth control/ protection: None     Other Topics Concern    Not on file     Social History Narrative         ALLERGIES: Shellfish derived    Review of Systems   Constitutional: Positive for fever. Negative for chills. HENT: Negative for congestion, ear pain, rhinorrhea and sore throat. Eyes: Negative for pain and redness. Respiratory: Negative for cough and shortness of breath. Cardiovascular: Negative for chest pain. Gastrointestinal: Positive for abdominal pain, diarrhea, nausea and vomiting. Negative for abdominal distention, anal bleeding, anorexia, blood in stool, constipation, flatus, hematochezia, melena and rectal pain. Endocrine: Negative. Genitourinary: Positive for pelvic pain. Negative for dysuria, frequency, hematuria, vaginal bleeding, vaginal discharge and vaginal pain. Musculoskeletal: Negative for arthralgias, back pain, myalgias, neck pain and neck stiffness. Skin: Negative. Allergic/Immunologic: Negative. Neurological: Negative for dizziness, weakness, light-headedness, numbness and headaches. Hematological: Negative. Psychiatric/Behavioral: Negative. Vitals:    12/21/17 1115   BP: 114/75   Pulse: 95   Resp: 20   Temp: 98.1 °F (36.7 °C)   SpO2: 100%   Weight: 74.4 kg (164 lb)   Height: 5' 9\" (1.753 m)            Physical Exam   Constitutional: She is oriented to person, place, and time. She appears well-developed and well-nourished. No distress. HENT:   Head: Normocephalic and atraumatic. Right Ear: Tympanic membrane, external ear and ear canal normal.   Left Ear: Tympanic membrane, external ear and ear canal normal.   Nose: Nose normal.   Mouth/Throat: Oropharynx is clear and moist and mucous membranes are normal.   Eyes: Conjunctivae and EOM are normal. Pupils are equal, round, and reactive to light. Neck: Normal range of motion. Neck supple. Cardiovascular: Normal rate, regular rhythm, normal heart sounds and intact distal pulses. Exam reveals no gallop and no friction rub. No murmur heard. Pulmonary/Chest: Effort normal and breath sounds normal. No respiratory distress. She has no wheezes. She has no rales. Abdominal: Soft. Normal appearance and bowel sounds are normal. She exhibits no distension. There is tenderness in the right lower quadrant, suprapubic area and left lower quadrant. There is guarding. There is no rigidity, no rebound, no CVA tenderness, no tenderness at McBurney's point and negative Denny's sign. Musculoskeletal: Normal range of motion. Lymphadenopathy:     She has no cervical adenopathy. Neurological: She is alert and oriented to person, place, and time. Skin: Skin is warm and dry. She is not diaphoretic. Psychiatric: She has a normal mood and affect. Her behavior is normal. Judgment and thought content normal.   Nursing note and vitals reviewed.        MDM  Number of Diagnoses or Management Options  BV (bacterial vaginosis): new and requires workup  Diagnosis management comments: DDx: gastroenteritis, GERD, hernia, hepatitis, pancreatitis, gallbladder etiology, constipation, adhesions, UTI, pyelo, kidney stones, STD,  Fmsw-Suma-Kcirzo syndrome, preg, ectopic, ovarian cyst, ovarian torsion, tubo-ovarian abscess, appendicitis, diverticulitis, SBO, GI bleed, mesenteric ischemia, AAA, cardiac etiology, musculoskeletal pain/spasm, malignancy    Pelvic exam tolerated with pain, d/w pt that there was yellow moderate discharge and possible GC as a source of this that can cause pelvic pain, pt indicated understanding and would like to go ahead with empiric tx with azithromycin and rocephin. CT abd pelv:  1. Dilated bilateral fallopian tubes was thickened and enhancing walls and  associated complex free fluid consistent with pyosalpinx. Findings are not  significantly changed compared to the prior exam.    D/w ED attending Dr. Brent Townsend, feels pt likely will be ok for outpatient tx, but recommends consult to OBGYN. Consult:    4:09 PM   Discussed care with Dr. Rajat Lewis with OBGYN. Standard discussion; including history of patient's chief complaint, available diagnostic results, and treatment course. PLAN: She will review chart and call back. Consult:    4:57 PM   Discussed care with Dr. Rajat Lewis. Standard discussion; including history of patients chief complaint, available diagnostic results, and treatment course. PLAN: She feels patient can be treated outpatient. Plan for DC to home with 2 weeks doxy and 1 week flagyl. Pt to return if fever, chills, or worsening pain. They will see pt in office in 2 weeks for re-check. Iqra Morales PA-C     Pt results have been reviewed with them. They have been counseled regarding diagnosis, treatment, and plan. Pt verbally conveys understanding and agreement of the signs, symptoms, diagnosis, treatment and prognosis and additionally agrees to follow up as discussed.  Pt also agrees with the care-plan and conveys that all of their questions have been answered. I have also provided discharge instructions for them that include: educational information regarding their diagnosis and treatment, and list of reasons why they would want to return to the ED prior to their follow-up appointment, should their condition change. Loni Nicholson PA-C 4:58 PM          Amount and/or Complexity of Data Reviewed  Clinical lab tests: ordered and reviewed  Tests in the radiology section of CPT®: ordered and reviewed  Review and summarize past medical records: yes  Discuss the patient with other providers: yes    Risk of Complications, Morbidity, and/or Mortality  Presenting problems: moderate  Diagnostic procedures: moderate  Management options: moderate    Patient Progress  Patient progress: stable    ED Course       Pelvic Exam  Date/Time: 12/21/2017 2:01 PM  Performed by: PA  Procedure duration:  5 minutes. Exam assisted by:  RN. Type of exam performed: bimanual and speculum. External genitalia appearance: normal.    Vaginal exam:  discharge. Bleeding: moderate  The amount of discharge was:  moderate. The discharge was yellow, thick and malodorous. Cervical exam:  discharge from cervix, minimal cervical motion tenderness and os closed. Specimen(s) collected:  GC and vaginal culture. Bimanual exam: Generalized discomfort, no masses noted, pain on R not greater than L. Patient tolerance of procedure: Patient tolerated with pain. Labs Reviewed   CBC WITH AUTOMATED DIFF - Abnormal; Notable for the following:        Result Value    NEUTROPHILS 77 (*)     LYMPHOCYTES 11 (*)     MONOCYTES 12 (*)     ABS. NEUTROPHILS 9.1 (*)     ABS.  MONOCYTES 1.4 (*)     All other components within normal limits   METABOLIC PANEL, COMPREHENSIVE - Abnormal; Notable for the following:     BUN 6 (*)     BUN/Creatinine ratio 8 (*)     ALT (SGPT) 12 (*)     AST (SGOT) 12 (*)     Globulin 4.2 (*)     All other components within normal limits   URINALYSIS W/ RFLX MICROSCOPIC - Abnormal; Notable for the following:     Protein TRACE (*)     Ketone 15 (*)     Leukocyte Esterase TRACE (*)     All other components within normal limits   URINE MICROSCOPIC ONLY - Abnormal; Notable for the following:     Bacteria 1+ (*)     Mucus 2+ (*)     All other components within normal limits   WET PREP   HCG URINE, QL   CHLAMYDIA/NEISSERIA AMPLIFICATION     Diagnosis:   1. BV (bacterial vaginosis)    2. Pyosalpinx          Disposition: Discharge to home. Follow-up Information     Follow up With Details Comments Contact Info    [facility] EM[facility]GENCY DEPT  As needed, If symptoms worsen [address] in 3 days  [address]    Sam Villavicencio MD Go in 1 week  [address] [place] [address]            Patient's Medications   Start Taking    DOXYCYCLINE (MONODOX) 100 MG CAPSULE    Take 1 Cap by mouth two (2) times a day for 14 days. HYDROCODONE-ACETAMINOPHEN (NORCO) 5-325 MG PER TABLET    Take 1 Tab by mouth every four (4) hours as needed for Pain. Max Daily Amount: 6 Tabs. METRONIDAZOLE (FLAGYL) 500 MG TABLET    Take 1 Tab by mouth three (3) times daily for 7 days. ONDANSETRON (ZOFRAN ODT) 8 MG DISINTEGRATING TABLET    Take 1 Tab by mouth every eight (8) hours as needed for Nausea. Continue Taking    HYDROXYZINE (VISTARIL) 50 MG CAPSULE    Take 100 mg by mouth three (3) times daily as needed for Itching. SERTRALINE (ZOLOFT) 100 MG TABLET    Take  by mouth daily. TRAZODONE (DESYREL) 100 MG TABLET    Take 100 mg by mouth nightly. These Medications have changed    No medications on file   Stop Taking    AMPICILLIN (PRINCIPEN) 500 MG CAPSULE    Take 1 Cap by mouth Before breakfast, lunch, dinner and at bedtime. DOXYCYCLINE (MONODOX) 100 MG CAPSULE    Take 1 Cap by mouth two (2) times a day. HYDROCODONE-ACETAMINOPHEN (NORCO) 5-325 MG PER TABLET    Take 1-2 tablets PO every 4-6 hours as needed for pain control. If over the counter ibuprofen or acetaminophen was suggested, then only take the vicodin for pain not well controlled with the over the counter medication. ONDANSETRON (ZOFRAN ODT) 4 MG DISINTEGRATING TABLET    Take 1 Tab by mouth every eight (8) hours as needed for Nausea.

## 2017-12-21 NOTE — ED NOTES
28 yo F with hx of pyelonephritis who presents to the ED C/o lower abdominal pain associated with n/v/d     I performed a brief evaluation, including history and physical, of the patient here in triage and I have determined that pt will need further treatment and evaluation from the fast track ER physician. I have placed initial orders to help in expediting patients care. December 21, 2017 at 11:16 AM - Dagmar Eduardo        There were no vitals taken for this visit.

## 2017-12-21 NOTE — ED NOTES
Pt tried to leave with brand new IV boxes in her coat pocket. Security was called. Pt returned IV boxes, and stated \"i've never been treated this way. \" Pt informed she cannot leave with IV's in her pocket. Pt then left.

## 2017-12-22 LAB
C TRACH RRNA SPEC QL NAA+PROBE: NEGATIVE
N GONORRHOEA RRNA SPEC QL NAA+PROBE: NEGATIVE
SPECIMEN SOURCE: NORMAL

## 2018-02-12 ENCOUNTER — APPOINTMENT (OUTPATIENT)
Dept: ULTRASOUND IMAGING | Age: 33
DRG: 531 | End: 2018-02-12
Attending: EMERGENCY MEDICINE
Payer: MEDICAID

## 2018-02-12 ENCOUNTER — HOSPITAL ENCOUNTER (INPATIENT)
Age: 33
LOS: 3 days | Discharge: HOME | DRG: 531 | End: 2018-02-15
Attending: EMERGENCY MEDICINE | Admitting: OBSTETRICS & GYNECOLOGY
Payer: MEDICAID

## 2018-02-12 DIAGNOSIS — N73.0 PID (ACUTE PELVIC INFLAMMATORY DISEASE): Primary | ICD-10-CM

## 2018-02-12 DIAGNOSIS — N70.93 TOA (TUBO-OVARIAN ABSCESS): ICD-10-CM

## 2018-02-12 DIAGNOSIS — R10.2 PELVIC PAIN: ICD-10-CM

## 2018-02-12 PROBLEM — R10.9 ABDOMINAL PAIN: Status: ACTIVE | Noted: 2018-02-12

## 2018-02-12 LAB
ALBUMIN SERPL-MCNC: 3 G/DL (ref 3.4–5)
ALBUMIN/GLOB SERPL: 0.9 {RATIO} (ref 0.8–1.7)
ALP SERPL-CCNC: 72 U/L (ref 45–117)
ALT SERPL-CCNC: 11 U/L (ref 13–56)
ANION GAP SERPL CALC-SCNC: 5 MMOL/L (ref 3–18)
APPEARANCE UR: ABNORMAL
AST SERPL-CCNC: 11 U/L (ref 15–37)
BACTERIA URNS QL MICRO: ABNORMAL /HPF
BASOPHILS # BLD: 0 K/UL (ref 0–0.1)
BASOPHILS NFR BLD: 0 % (ref 0–2)
BILIRUB SERPL-MCNC: 0.5 MG/DL (ref 0.2–1)
BILIRUB UR QL: NEGATIVE
BUN SERPL-MCNC: 5 MG/DL (ref 7–18)
BUN/CREAT SERPL: 7 (ref 12–20)
CALCIUM SERPL-MCNC: 8.2 MG/DL (ref 8.5–10.1)
CHLORIDE SERPL-SCNC: 106 MMOL/L (ref 100–108)
CO2 SERPL-SCNC: 27 MMOL/L (ref 21–32)
COLOR UR: YELLOW
CREAT SERPL-MCNC: 0.68 MG/DL (ref 0.6–1.3)
DIFFERENTIAL METHOD BLD: ABNORMAL
EOSINOPHIL # BLD: 0 K/UL (ref 0–0.4)
EOSINOPHIL NFR BLD: 0 % (ref 0–5)
EPITH CASTS URNS QL MICRO: ABNORMAL /LPF (ref 0–5)
ERYTHROCYTE [DISTWIDTH] IN BLOOD BY AUTOMATED COUNT: 15.2 % (ref 11.6–14.5)
GLOBULIN SER CALC-MCNC: 3.5 G/DL (ref 2–4)
GLUCOSE SERPL-MCNC: 83 MG/DL (ref 74–99)
GLUCOSE UR STRIP.AUTO-MCNC: NEGATIVE MG/DL
HCG UR QL: NEGATIVE
HCT VFR BLD AUTO: 34.9 % (ref 35–45)
HGB BLD-MCNC: 11.4 G/DL (ref 12–16)
HGB UR QL STRIP: NEGATIVE
KETONES UR QL STRIP.AUTO: NEGATIVE MG/DL
LEUKOCYTE ESTERASE UR QL STRIP.AUTO: ABNORMAL
LYMPHOCYTES # BLD: 2.2 K/UL (ref 0.9–3.6)
LYMPHOCYTES NFR BLD: 26 % (ref 21–52)
MCH RBC QN AUTO: 28.4 PG (ref 24–34)
MCHC RBC AUTO-ENTMCNC: 32.7 G/DL (ref 31–37)
MCV RBC AUTO: 87 FL (ref 74–97)
MONOCYTES # BLD: 0.9 K/UL (ref 0.05–1.2)
MONOCYTES NFR BLD: 11 % (ref 3–10)
NEUTS SEG # BLD: 5.5 K/UL (ref 1.8–8)
NEUTS SEG NFR BLD: 63 % (ref 40–73)
NITRITE UR QL STRIP.AUTO: NEGATIVE
PH UR STRIP: 7.5 [PH] (ref 5–8)
PLATELET # BLD AUTO: 319 K/UL (ref 135–420)
PMV BLD AUTO: 11 FL (ref 9.2–11.8)
POTASSIUM SERPL-SCNC: 3.9 MMOL/L (ref 3.5–5.5)
PROT SERPL-MCNC: 6.5 G/DL (ref 6.4–8.2)
PROT UR STRIP-MCNC: ABNORMAL MG/DL
RBC # BLD AUTO: 4.01 M/UL (ref 4.2–5.3)
SERVICE CMNT-IMP: NORMAL
SODIUM SERPL-SCNC: 138 MMOL/L (ref 136–145)
SP GR UR REFRACTOMETRY: 1.02 (ref 1–1.03)
UROBILINOGEN UR QL STRIP.AUTO: 1 EU/DL (ref 0.2–1)
WBC # BLD AUTO: 8.6 K/UL (ref 4.6–13.2)
WBC URNS QL MICRO: ABNORMAL /HPF (ref 0–4)
WET PREP GENITAL: NORMAL

## 2018-02-12 PROCEDURE — 74011250636 HC RX REV CODE- 250/636: Performed by: OBSTETRICS & GYNECOLOGY

## 2018-02-12 PROCEDURE — 65270000029 HC RM PRIVATE

## 2018-02-12 PROCEDURE — 81025 URINE PREGNANCY TEST: CPT | Performed by: EMERGENCY MEDICINE

## 2018-02-12 PROCEDURE — 96375 TX/PRO/DX INJ NEW DRUG ADDON: CPT

## 2018-02-12 PROCEDURE — 80053 COMPREHEN METABOLIC PANEL: CPT | Performed by: EMERGENCY MEDICINE

## 2018-02-12 PROCEDURE — 85025 COMPLETE CBC W/AUTO DIFF WBC: CPT | Performed by: EMERGENCY MEDICINE

## 2018-02-12 PROCEDURE — 99284 EMERGENCY DEPT VISIT MOD MDM: CPT

## 2018-02-12 PROCEDURE — 81001 URINALYSIS AUTO W/SCOPE: CPT | Performed by: EMERGENCY MEDICINE

## 2018-02-12 PROCEDURE — 96365 THER/PROPH/DIAG IV INF INIT: CPT

## 2018-02-12 PROCEDURE — 74011250636 HC RX REV CODE- 250/636

## 2018-02-12 PROCEDURE — 74011000250 HC RX REV CODE- 250: Performed by: EMERGENCY MEDICINE

## 2018-02-12 PROCEDURE — 74011000258 HC RX REV CODE- 258: Performed by: EMERGENCY MEDICINE

## 2018-02-12 PROCEDURE — 87491 CHLMYD TRACH DNA AMP PROBE: CPT | Performed by: EMERGENCY MEDICINE

## 2018-02-12 PROCEDURE — 96376 TX/PRO/DX INJ SAME DRUG ADON: CPT

## 2018-02-12 PROCEDURE — 74011250637 HC RX REV CODE- 250/637: Performed by: OBSTETRICS & GYNECOLOGY

## 2018-02-12 PROCEDURE — 74011250636 HC RX REV CODE- 250/636: Performed by: EMERGENCY MEDICINE

## 2018-02-12 PROCEDURE — 87210 SMEAR WET MOUNT SALINE/INK: CPT | Performed by: EMERGENCY MEDICINE

## 2018-02-12 PROCEDURE — 76830 TRANSVAGINAL US NON-OB: CPT

## 2018-02-12 RX ORDER — OXYCODONE AND ACETAMINOPHEN 5; 325 MG/1; MG/1
1 TABLET ORAL
Status: DISCONTINUED | OUTPATIENT
Start: 2018-02-12 | End: 2018-02-13

## 2018-02-12 RX ORDER — GENTAMICIN SULFATE 100 MG/100ML
100 INJECTION, SOLUTION INTRAVENOUS EVERY 8 HOURS
Status: DISCONTINUED | OUTPATIENT
Start: 2018-02-12 | End: 2018-02-12 | Stop reason: DRUGHIGH

## 2018-02-12 RX ORDER — KETOROLAC TROMETHAMINE 30 MG/ML
30 INJECTION, SOLUTION INTRAMUSCULAR; INTRAVENOUS
Status: COMPLETED | OUTPATIENT
Start: 2018-02-12 | End: 2018-02-12

## 2018-02-12 RX ORDER — ONDANSETRON 2 MG/ML
4 INJECTION INTRAMUSCULAR; INTRAVENOUS
Status: DISCONTINUED | OUTPATIENT
Start: 2018-02-12 | End: 2018-02-15 | Stop reason: HOSPADM

## 2018-02-12 RX ORDER — SODIUM CHLORIDE 0.9 % (FLUSH) 0.9 %
5-10 SYRINGE (ML) INJECTION AS NEEDED
Status: DISCONTINUED | OUTPATIENT
Start: 2018-02-12 | End: 2018-02-15 | Stop reason: HOSPADM

## 2018-02-12 RX ORDER — MORPHINE SULFATE 2 MG/ML
INJECTION, SOLUTION INTRAMUSCULAR; INTRAVENOUS
Status: COMPLETED
Start: 2018-02-12 | End: 2018-02-12

## 2018-02-12 RX ORDER — MORPHINE SULFATE 2 MG/ML
4 INJECTION, SOLUTION INTRAMUSCULAR; INTRAVENOUS
Status: COMPLETED | OUTPATIENT
Start: 2018-02-12 | End: 2018-02-12

## 2018-02-12 RX ORDER — AMPICILLIN 2 G/1
2 INJECTION, POWDER, FOR SOLUTION INTRAVENOUS EVERY 6 HOURS
Status: DISCONTINUED | OUTPATIENT
Start: 2018-02-12 | End: 2018-02-12 | Stop reason: CLARIF

## 2018-02-12 RX ORDER — SODIUM CHLORIDE 0.9 % (FLUSH) 0.9 %
5-10 SYRINGE (ML) INJECTION EVERY 8 HOURS
Status: DISCONTINUED | OUTPATIENT
Start: 2018-02-12 | End: 2018-02-15 | Stop reason: HOSPADM

## 2018-02-12 RX ORDER — SODIUM CHLORIDE 9 MG/ML
100 INJECTION, SOLUTION INTRAVENOUS CONTINUOUS
Status: DISCONTINUED | OUTPATIENT
Start: 2018-02-12 | End: 2018-02-15 | Stop reason: HOSPADM

## 2018-02-12 RX ORDER — ACETAMINOPHEN 325 MG/1
650 TABLET ORAL
Status: DISCONTINUED | OUTPATIENT
Start: 2018-02-12 | End: 2018-02-15 | Stop reason: HOSPADM

## 2018-02-12 RX ADMIN — Medication 4 MG: at 17:01

## 2018-02-12 RX ADMIN — MORPHINE SULFATE 4 MG: 2 INJECTION, SOLUTION INTRAMUSCULAR; INTRAVENOUS at 17:01

## 2018-02-12 RX ADMIN — OXYCODONE HYDROCHLORIDE AND ACETAMINOPHEN 1 TABLET: 5; 325 TABLET ORAL at 21:53

## 2018-02-12 RX ADMIN — SODIUM CHLORIDE 1000 ML: 900 INJECTION, SOLUTION INTRAVENOUS at 15:04

## 2018-02-12 RX ADMIN — KETOROLAC TROMETHAMINE 30 MG: 30 INJECTION, SOLUTION INTRAMUSCULAR at 14:58

## 2018-02-12 RX ADMIN — SODIUM CHLORIDE 100 ML/HR: 900 INJECTION, SOLUTION INTRAVENOUS at 20:53

## 2018-02-12 RX ADMIN — Medication 4 MG: at 15:01

## 2018-02-12 RX ADMIN — GENTAMICIN SULFATE 331.2 MG: 40 INJECTION, SOLUTION INTRAMUSCULAR; INTRAVENOUS at 20:53

## 2018-02-12 RX ADMIN — AMPICILLIN 2 G: 2 INJECTION, POWDER, FOR SOLUTION INTRAVENOUS at 18:48

## 2018-02-12 RX ADMIN — CLINDAMYCIN PHOSPHATE 900 MG: 150 INJECTION, SOLUTION INTRAVENOUS at 16:19

## 2018-02-12 NOTE — ED TRIAGE NOTES
Pt reports diffuse lower abd pain, fever, nausea. Pt states this has been a chronic issue since last May. Pt reports being put on antbx, finishes the course, but then \"ends up here\" when it's done.

## 2018-02-12 NOTE — ED PROVIDER NOTES
EMERGENCY DEPARTMENT HISTORY AND PHYSICAL EXAM    11:52 AM      Date: 2/12/2018  Patient Name: Chichi Barbour    History of Presenting Illness     Chief Complaint   Patient presents with    Abdominal Pain    Fever    Nausea         History Provided By: Patient    Chief Complaint: Abdominal pain  Duration:  2 days  Timing:  Intermittent and Chronic  Location: Abdomen  Quality: Not reported  Severity: 7 out of 10  Modifying Factors: None  Associated Symptoms: Back pain, weight loss, decreased appetite      Additional History (Context): Chichi Barbour is a 29 y.o. female with PMHx of BV and anxiety who presents with to the ED with c/o 7/10 intermittent exacerbation of chronic ower abdominal pain for the past 2 days. Reports she has been visiting the ED for similar sx since May 2017, \"I come here they tell me I have a tubal infection, I get abx and feel better. \" Reports vaginal discharge associated with pain in the past, denies vaginal discharge currently. Associated symptoms include back pain, weight loss and decreased appetite. Denies use of birth control or use of condoms, claims she has not had sexual intercourse since November 2017. Reports same sexual partner for the past 3 years. Admits to smoking and marijuana use. Denies ETOH use. No modifying or aggravating factors were reported. No other symptoms or concerns were expressed.       PCP: None    Current Facility-Administered Medications   Medication Dose Route Frequency Provider Last Rate Last Dose    clindamycin phosphate 900 mg in 0.9% sodium chloride (MBP/ADV) 100 mL ADV  900 mg IntraVENous Q8H Coy Best MD   Stopped at 02/12/18 1649    ampicillin (OMNIPEN) 2 g in 0.9% sodium chloride (MBP/ADV) 100 mL MBP  2 g IntraVENous Q6H Coy Best MD        gentamicin (GARAMYCIN) 331.2 mg in 0.9% sodium chloride 100 mL IVPB  5 mg/kg (Ideal) IntraVENous Q24H Coy Best MD        [START ON 2/13/2018] Gentamicin random   1 Each Other Rx Dosing/Monitoring Wander Celestin MD         Current Outpatient Prescriptions   Medication Sig Dispense Refill    HYDROcodone-acetaminophen (NORCO) 5-325 mg per tablet Take 1 Tab by mouth every four (4) hours as needed for Pain. Max Daily Amount: 6 Tabs. 16 Tab 0    ondansetron (ZOFRAN ODT) 8 mg disintegrating tablet Take 1 Tab by mouth every eight (8) hours as needed for Nausea. 15 Tab 0    sertraline (ZOLOFT) 100 mg tablet Take  by mouth daily.  hydrOXYzine (VISTARIL) 50 mg capsule Take 100 mg by mouth three (3) times daily as needed for Itching.  traZODone (DESYREL) 100 mg tablet Take 100 mg by mouth nightly. Past History     Past Medical History:  Past Medical History:   Diagnosis Date    Adnexal mass 06/05/2017    U/S Result: There is a enlarged, tubular structure within the right adnexa, possibly a dilated fallopian tube. Complex hydrosalpinx or pyosalpinx could be considered. Tubo-ovarian abscess is not entirely excluded.  Anxiety     BV (bacterial vaginosis)     Depression     Emotional instability     Normocytic anemia        Past Surgical History:  No past surgical history on file. Family History:  Family History   Problem Relation Age of Onset    Hypertension Maternal Grandmother     Diabetes Paternal Grandmother     Diabetes Maternal Aunt     Diabetes Paternal Uncle     Hypertension Maternal Uncle        Social History:  Social History   Substance Use Topics    Smoking status: Current Every Day Smoker     Packs/day: 1.50     Types: Cigarettes    Smokeless tobacco: Never Used    Alcohol use No       Allergies: Allergies   Allergen Reactions    Shellfish Derived Anaphylaxis         Review of Systems       Review of Systems   Constitutional: Positive for appetite change (decreased) and unexpected weight change (weight loss). Negative for activity change, fatigue and fever. HENT: Negative for congestion and rhinorrhea.     Eyes: Negative for visual disturbance. Respiratory: Negative for shortness of breath. Cardiovascular: Negative for chest pain and palpitations. Gastrointestinal: Positive for abdominal pain. Negative for diarrhea, nausea and vomiting. Genitourinary: Negative for dysuria, hematuria and vaginal discharge. Musculoskeletal: Negative for back pain. Skin: Negative for rash. Neurological: Negative for dizziness, weakness and light-headedness. Physical Exam     Visit Vitals    /74    Pulse 90    Temp 97.9 °F (36.6 °C)    Resp 16    Ht 5' 9\" (1.753 m)    Wt 73.9 kg (163 lb)    SpO2 97%    BMI 24.07 kg/m2         Physical Exam   Constitutional: She is oriented to person, place, and time. She appears well-developed and well-nourished. No distress. HENT:   Head: Normocephalic and atraumatic. Right Ear: External ear normal.   Left Ear: External ear normal.   Nose: Nose normal.   Mouth/Throat: Oropharynx is clear and moist.   Eyes: Conjunctivae and EOM are normal. Pupils are equal, round, and reactive to light. No scleral icterus. Neck: Normal range of motion. Neck supple. No JVD present. No tracheal deviation present. No thyromegaly present. Cardiovascular: Normal rate, regular rhythm, normal heart sounds and intact distal pulses. Exam reveals no gallop and no friction rub. No murmur heard. Pulmonary/Chest: Effort normal and breath sounds normal. She exhibits no tenderness. Abdominal: Soft. Bowel sounds are normal. She exhibits no distension. There is tenderness (mild) in the epigastric area, left upper quadrant and left lower quadrant. There is no rebound and no guarding. Musculoskeletal: Normal range of motion. She exhibits no edema or tenderness. Lymphadenopathy:     She has no cervical adenopathy. Neurological: She is alert and oriented to person, place, and time. No cranial nerve deficit.  Coordination normal.   No sensory loss, Gait normal, Motor 5/5   Skin: Skin is warm and dry. Psychiatric: She has a normal mood and affect. Her behavior is normal. Judgment and thought content normal.   Nursing note and vitals reviewed. Diagnostic Study Results     Labs -  Recent Results (from the past 12 hour(s))   URINALYSIS W/ RFLX MICROSCOPIC    Collection Time: 02/12/18 10:52 AM   Result Value Ref Range    Color YELLOW      Appearance TURBID      Specific gravity 1.024 1.005 - 1.030      pH (UA) 7.5 5.0 - 8.0      Protein TRACE (A) NEG mg/dL    Glucose NEGATIVE  NEG mg/dL    Ketone NEGATIVE  NEG mg/dL    Bilirubin NEGATIVE  NEG      Blood NEGATIVE  NEG      Urobilinogen 1.0 0.2 - 1.0 EU/dL    Nitrites NEGATIVE  NEG      Leukocyte Esterase SMALL (A) NEG     HCG URINE, QL    Collection Time: 02/12/18 10:52 AM   Result Value Ref Range    HCG urine, QL NEGATIVE  NEG     URINE MICROSCOPIC ONLY    Collection Time: 02/12/18 10:52 AM   Result Value Ref Range    WBC 5 to 7 0 - 4 /hpf    Epithelial cells 2+ 0 - 5 /lpf    Bacteria 3+ (A) NEG /hpf   CBC WITH AUTOMATED DIFF    Collection Time: 02/12/18  2:44 PM   Result Value Ref Range    WBC 8.6 4.6 - 13.2 K/uL    RBC 4.01 (L) 4.20 - 5.30 M/uL    HGB 11.4 (L) 12.0 - 16.0 g/dL    HCT 34.9 (L) 35.0 - 45.0 %    MCV 87.0 74.0 - 97.0 FL    MCH 28.4 24.0 - 34.0 PG    MCHC 32.7 31.0 - 37.0 g/dL    RDW 15.2 (H) 11.6 - 14.5 %    PLATELET 151 537 - 410 K/uL    MPV 11.0 9.2 - 11.8 FL    NEUTROPHILS 63 40 - 73 %    LYMPHOCYTES 26 21 - 52 %    MONOCYTES 11 (H) 3 - 10 %    EOSINOPHILS 0 0 - 5 %    BASOPHILS 0 0 - 2 %    ABS. NEUTROPHILS 5.5 1.8 - 8.0 K/UL    ABS. LYMPHOCYTES 2.2 0.9 - 3.6 K/UL    ABS. MONOCYTES 0.9 0.05 - 1.2 K/UL    ABS. EOSINOPHILS 0.0 0.0 - 0.4 K/UL    ABS.  BASOPHILS 0.0 0.0 - 0.1 K/UL    DF AUTOMATED     WET PREP    Collection Time: 02/12/18  4:20 PM   Result Value Ref Range    Special Requests: NO SPECIAL REQUESTS      Wet prep MANY  CLUE CELLS PRESENT        Wet prep NO YEAST SEEN      Wet prep NO TRICHOMONAS SEEN     METABOLIC PANEL, COMPREHENSIVE    Collection Time: 02/12/18  4:20 PM   Result Value Ref Range    Sodium 138 136 - 145 mmol/L    Potassium 3.9 3.5 - 5.5 mmol/L    Chloride 106 100 - 108 mmol/L    CO2 27 21 - 32 mmol/L    Anion gap 5 3.0 - 18 mmol/L    Glucose 83 74 - 99 mg/dL    BUN 5 (L) 7.0 - 18 MG/DL    Creatinine 0.68 0.6 - 1.3 MG/DL    BUN/Creatinine ratio 7 (L) 12 - 20      GFR est AA >60 >60 ml/min/1.73m2    GFR est non-AA >60 >60 ml/min/1.73m2    Calcium 8.2 (L) 8.5 - 10.1 MG/DL    Bilirubin, total 0.5 0.2 - 1.0 MG/DL    ALT (SGPT) 11 (L) 13 - 56 U/L    AST (SGOT) 11 (L) 15 - 37 U/L    Alk. phosphatase 72 45 - 117 U/L    Protein, total 6.5 6.4 - 8.2 g/dL    Albumin 3.0 (L) 3.4 - 5.0 g/dL    Globulin 3.5 2.0 - 4.0 g/dL    A-G Ratio 0.9 0.8 - 1.7         Radiologic Studies -   US TRANSVAGINAL   Final Result      US TV:  1. Large fluid-filled tubular lesion with thickened wall and surrounding free  fluid again noted in right adnexa, compatible with chronic  pyosalpinx. No  significant interval change noted compared to the prior ultrasound in 9/17.     2. Stable small left ovarian cyst.     3. Small free pelvic fluid. Medical Decision Making   I am the first provider for this patient. I reviewed the vital signs, available nursing notes, past medical history, past surgical history, family history and social history. Vital Signs-Reviewed the patient's vital signs. Pulse Oximetry Analysis -  97% on room air, normal    Records Reviewed: Nursing Notes and Old Medical Records (Time of Review: 11:52 AM)    ED Course: Progress Notes, Reevaluation, and Consults:  3:16 PM: Reviewed patient labs and imaging. Patient has a normal white count, US suggestive of chronic R hydrosalpinx. Given patient's amount of pain and fever at home, will start he on Clindamycin while waiting on call-back from OBGYN regarding patient treatment and plan. Consult:  Discussed care with Dr. Judith Joseph, OBGYN.  Standard discussion; including history of patients chief complaint, available diagnostic results, and treatment course. Failed outpatient therapy in the past. Accepts admit to L&D.  5:14 PM, 2/12/2018     Consult:  Discussed care with LEVI Greer. Standard discussion; including history of patients chief complaint, available diagnostic results, and treatment course. Requests vaginal culture. 6:07 PM, 2/12/2018     Provider Notes (Medical Decision Making): 29 y.o. female with known hx of hydrosalpinx treated on separate occasions. Has followed up with OBGYN according to patient. Presents with return of pain, nausea, vomiting and pain. Admits to being sexually active with continuous unprotective intercourse with same sexual partner, unsure if he is currently being treated. Will trend her labs, repeat US to evaluate for TOA and reevaluate. For Hospitalized Patients:    1. Hospitalization Decision Time:  The decision to hospitalize the patient was made by Dr. Hi Geller at 5:15 PM on 2/12/2018    2. Aspirin: Aspirin was not given because the patient did not present with a stroke at the time of their Emergency Department evaluation    Diagnosis     Clinical Impression: TOA, PID, pelvic pain   Disposition: Admit. Follow-up Information     None           Patient's Medications   Start Taking    No medications on file   Continue Taking    HYDROCODONE-ACETAMINOPHEN (NORCO) 5-325 MG PER TABLET    Take 1 Tab by mouth every four (4) hours as needed for Pain. Max Daily Amount: 6 Tabs. HYDROXYZINE (VISTARIL) 50 MG CAPSULE    Take 100 mg by mouth three (3) times daily as needed for Itching. ONDANSETRON (ZOFRAN ODT) 8 MG DISINTEGRATING TABLET    Take 1 Tab by mouth every eight (8) hours as needed for Nausea. SERTRALINE (ZOLOFT) 100 MG TABLET    Take  by mouth daily. TRAZODONE (DESYREL) 100 MG TABLET    Take 100 mg by mouth nightly.    These Medications have changed    No medications on file   Stop Taking    No medications on file _______________________________    Attestations:  Scribe Attestation     Judy Santana acting as a scribe for and in the presence of Carmen Perez MD      February 12, 2018 at 11:52 AM       Provider Attestation:      I personally performed the services described in the documentation, reviewed the documentation, as recorded by the scribe in my presence, and it accurately and completely records my words and actions.  February 12, 2018 at 11:52 AM - Carmen Perez MD    _______________________________

## 2018-02-12 NOTE — IP AVS SNAPSHOT
303 [facility] 
 
 
 920 [facility] [address] [place] [address] Patient: Vasu Morales MRN: [id number] About your hospitalization You were admitted on:  February 12, 2018 You last received care in the:  [facility] [id number] [place] [address] You were discharged on:  February 15, 2018 Why you were hospitalized Your primary diagnosis was:  Not on File Your diagnoses also included:  Abdominal Pain, Pid (Acute Pelvic Inflammatory Disease) Follow-up Information Follow up With Details Comments Contact Info Bandar Harris MD On 2/23/2018 @ 11:15 14 [facility] [address] [place] [id number] 
[phone] Your Scheduled Appointments Friday February 23, 2018 11:15 AM EST New Patient with Bandar Harris MD  
[address] ([facility]) 14 [address] [address]  
[phone] Discharge Orders None A check wesley indicates which time of day the medication should be taken. My Medications START taking these medications Instructions Each Dose to Equal  
 Morning Noon Evening Bedtime  
 acetaminophen 500 mg tablet Commonly known as:  80 Aaron Rockwell [facility] Your last dose was: Your next dose is: Take 1 Tab by mouth every six (6) hours as needed for Pain. Indications: Pain, pelvic pain 500 mg  
    
   
   
   
  
 doxycycline 100 mg capsule Commonly known as:  Asya Chapman Your last dose was: Your next dose is: Take 1 Cap by mouth two (2) times a day for 14 days. Indications: PID  
 100 mg  
    
   
   
   
  
 ibuprofen 600 mg tablet Commonly known as:  MOTRIN Your last dose was: Your next dose is: Take 1 Tab by mouth every six (6) hours as needed for Pain. Indications: pelvic pain  600 mg  
    
   
   
   
  
 metroNIDAZOLE 500 mg tablet Commonly known as:  FLAGYL Your last dose was: Your next dose is: Take 1 Tab by mouth every twelve (12) hours for 14 days. Indications: PID  
 500 mg CONTINUE taking these medications Instructions Each Dose to Equal  
 Morning Noon Evening Bedtime  
 ondansetron 8 mg disintegrating tablet Commonly known as:  ZOFRAN ODT Your last dose was: Your next dose is: Take 1 Tab by mouth every eight (8) hours as needed for Nausea. 8 mg  
    
   
   
   
  
 traZODone 100 mg tablet Commonly known as:  Gladystine Gemma Your last dose was: Your next dose is: Take 100 mg by mouth nightly. 100 mg  
    
   
   
   
  
 VISTARIL 50 mg capsule Generic drug:  hydrOXYzine pamoate Your last dose was: Your next dose is: Take 100 mg by mouth three (3) times daily as needed for Itching. 100 mg ZOLOFT 100 mg tablet Generic drug:  sertraline Your last dose was: Your next dose is: Take  by mouth daily. STOP taking these medications HYDROcodone-acetaminophen 5-325 mg per tablet Commonly known as:  Angie Saleem Where to Get Your Medications These medications were sent to [address] [place] [address] [address] [facility] [address] Phone:  [phone]  
  acetaminophen 500 mg tablet  
 doxycycline 100 mg capsule  
 ibuprofen 600 mg tablet  
 metroNIDAZOLE 500 mg tablet Discharge Instructions Activity: No sex, douching, or tampons for 6 weeks or as directed by your physician. Diet: Resume pre-hospital diet Call the office or return to the emergency department if fevers/chills or worsening abdominal pain.  
  
Follow-up with [facility] in 2 weeks. Pelvic Inflammatory Disease: Care Instructions Your Care Instructions Pelvic inflammatory disease, or PID, is an infection of a woman's fallopian tubes and other reproductive organs. PID is usually caused by a sexually transmitted infection (STI), such as gonorrhea or chlamydia. PID can cause scars in the fallopian tubes, making it hard for a woman to get pregnant. Having one STI increases your risk for other STIs, such as genital herpes, genital warts, syphilis, and HIV. It is important to take all the medicine that was prescribed. PID can cause serious health problems if you do not complete your treatment. Follow-up care is a key part of your treatment and safety. Be sure to make and go to all appointments, and call your doctor if you are having problems. It's also a good idea to know your test results and keep a list of the medicines you take. How can you care for yourself at home? · Take your antibiotics as directed. Do not stop taking them just because you feel better. You need to take the full course of antibiotics. · Rest until your fever and pain have improved. · Take pain medicines exactly as directed. ¨ If the doctor gave you a prescription medicine for pain, take it as prescribed. ¨ If you are not taking a prescription pain medicine, ask your doctor if you can take an over-the-counter medicine. · Use a hot water bottle or a heating pad (set on low) on your belly for pain. · Do not douche. · Do not have sex or use tampons (you can use pads instead) until you have taken all the medicine, your pain is gone, and you feel completely well. · Talk to any sex partners you have had in the past 2 months. They need to be tested and may need to be treated for STIs. To prevent STIs · Use latex condoms every time you have sex. Use them from the beginning to the end of sexual contact. · Talk to your partner before you have sex.  Find out if he or she has or is at risk for any sexually transmitted infection (STI). Keep in mind that a person may be able to spread an STI even if he or she does not have symptoms. · Do not have sex with anyone who has symptoms of an STI, such as sores on the genitals or mouth. · Having one sex partner (who does not have STIs and does not have sex with anyone else) is a good way to avoid STIs. When should you call for help? Call your doctor now or seek immediate medical care if: 
? · You have a new or higher fever. ? · You have unusual vaginal bleeding. ? · You have new or worse belly or pelvic pain. ? · You have vaginal discharge that has increased in amount or smells bad. ? Watch closely for changes in your health, and be sure to contact your doctor if: 
? · You do not get better as expected. Where can you learn more? Go to http://bella-keri.info/. Enter N294 in the search box to learn more about \"Pelvic Inflammatory Disease: Care Instructions. \" Current as of: October 13, 2016 Content Version: 11.4 © 0698-3038 [employer]. Care instructions adapted under license by [employer] (which disclaims liability or warranty for this information). If you have questions about a medical condition or this instruction, always ask your healthcare professional. [address] any warranty or liability for your use of this information. Patient armband removed and shredded DISCHARGE SUMMARY from Nurse PATIENT INSTRUCTIONS: 
 
 
F-face looks uneven A-arms unable to move or move unevenly S-speech slurred or non-existent T-time-call 911 as soon as signs and symptoms begin-DO NOT go Back to bed or wait to see if you get better-TIME IS BRAIN. Warning Signs of HEART ATTACK Call 911 if you have these symptoms: 
? Chest discomfort. Most heart attacks involve discomfort in the center of the chest that lasts more than a few minutes, or that goes away and comes back. It can feel like uncomfortable pressure, squeezing, fullness, or pain. ? Discomfort in other areas of the upper body. Symptoms can include pain or discomfort in one or both arms, the back, neck, jaw, or stomach. ? Shortness of breath with or without chest discomfort. ? Other signs may include breaking out in a cold sweat, nausea, or lightheadedness. Don't wait more than five minutes to call [address]! Fast action can save your life. Calling 911 is almost always the fastest way to get lifesaving treatment. Emergency Medical Services staff can begin treatment when they arrive  up to an hour sooner than if someone gets to the hospital by car. The discharge information has been reviewed with the patient. The patient verbalized understanding. Discharge medications reviewed with the patient and appropriate educational materials and side effects teaching were provided. ___________________________________________________________________________________________________________________________________ Datalog[employer] Announcement We are excited to announce that we are making your provider's discharge notes available to you in Elixservet. You will see these notes when they are completed and signed by the physician that discharged you from your recent hospital stay. If you have any questions or concerns about any information you see in Elixservet, please call the Health Information Department where you were seen or reach out to your Primary Care Provider for more information about your plan of care. Introducing [facility]! Essence Cowart introduces [employer] patient portal. Now you can access parts of your medical record, email your doctor's office, and request medication refills online. 1. In your internet browser, go to [email or web address] 2. Click on the First Time User? Click Here link in the Sign In box. You will see the New Member Sign Up page. 3. Enter your [employer] Access Code exactly as it appears below. You will not need to use this code after youve completed the sign-up process. If you do not sign up before the expiration date, you must request a new code. · [employer] Access Code: [email or web address] Expires: 3/21/2018 12:09 PM 
 
4. Enter the last four digits of your Social Security Number (xxxx) and Date of Birth (mm/dd/yyyy) as indicated and click Submit. You will be taken to the next sign-up page. 5. Create a [employer] ID. This will be your [employer] login ID and cannot be changed, so think of one that is secure and easy to remember. 6. Create a [employer] password. You can change your password at any time. 7. Enter your Password Reset Question and Answer. This can be used at a later time if you forget your password. 8. Enter your e-mail address. You will receive e-mail notification when new information is available in [address] 9. Click Sign Up. You can now view and download portions of your medical record. 10. Click the Download Summary menu link to download a portable copy of your medical information. If you have questions, please visit the Frequently Asked Questions section of the [employer] website. Remember, [employer] is NOT to be used for urgent needs. For medical emergencies, dial 911. Now available from your iPhone and Android! Providers Seen During Your Hospitalization Provider Specialty Primary office phone Attila Terry MD Emergency Medicine [phone] Darcy Louise MD Obstetrics & Gynecology [phone] Your Primary Care Physician (PCP) Primary Care Physician Office Phone Office Fax 1039 [facility], [address] [place] [address] [phone] You are allergic to the following Allergen Reactions Shellfish Derived Anaphylaxis Recent Documentation Height Weight Breastfeeding? BMI OB Status Smoking Status 1.753 m 73.9 kg No 24.07 kg/m2 Having regular periods Current Every Day Smoker Emergency Contacts Name Discharge Info Relation Home Work Mobile [employer] CAREGIVER [3] Other Relative [6] [phone] Patient Belongings The following personal items are in your possession at time of discharge: 
  Dental Appliances: None  Visual Aid: None      Home Medications: None   Jewelry: None  Clothing: Footwear, Shirt, Pants, Undergarments, Jacket/Coat, With patient    Other Valuables: None  Personal Items Sent to Safe: n/a Discharge Instructions Attachments/References ACETAMINOPHEN (BY MOUTH) (ENGLISH) DOXYCYCLINE (BY MOUTH) (ENGLISH) IBUPROFEN (BY MOUTH) (ENGLISH) METRONIDAZOLE (BY MOUTH) (ENGLISH) Patient Handouts Acetaminophen (By mouth) Acetaminophen (m-bimr-d-MIN-oh-fen) Treats minor aches and pain and reduces fever. Brand Name(s): 8 Hour Pain Relief, Acetaminophen Children's, Acetaminophen Infant's, Arthritis Pain Formula, Arthritis Pain Relief, Cetafen, Cetafen Extra, Children's Acetaminophen, Children's Dye Free Pain and Fever, Children's Mapap, Children's Pain & Fever, Children's Pain Relief, Children's Pain Reliever, Children's Tylenol, Comtrex Sore Throat Relief There may be other brand names for this medicine. When This Medicine Should Not Be Used: This medicine is not right for everyone. Do not use it if you had an allergic reaction to acetaminophen. How to Use This Medicine:  
Capsule, Liquid Filled Capsule, Liquid, Powder, Tablet, Chewable Tablet, Dissolving Tablet, Fizzy Tablet, Long Acting Tablet · Your doctor will tell you how much medicine to use. Do not use more than directed. · If you are taking this medicine without the advice of your doctor, carefully read and follow the Drug Facts label and dosing instructions on the medicine package. Ask your doctor or pharmacist if you are not sure how to use this medicine. · Do not take this medicine for more than 10 days in a row, unless directed by your doctor. · The chewable tablet should be chewed or crushed before you swallow it. · Oral liquids: Measure the oral liquid medicine with a marked measuring spoon, oral syringe, or medicine cup. Do not use a spoon, syringe, or cup that came with a different medicine. · Oral liquid (with syringe): ¨ Shake the bottle well before each use. ¨ Remove the cap. Attach the syringe to the flow restrictor. Turn the bottle upside down. ¨ Pull back the syringe plunger until it is filled with the correct dose. Ask your doctor or pharmacist if you are not sure how much medicine to use. ¨ Slowly give the medicine into your child's mouth. Point the syringe so the medicine goes toward the inner cheek. · Oral liquid (with dropper): ¨ Shake the bottle well before each use. ¨ Remove the cap. Insert the dropper into the bottle. Withdraw the correct dose. Ask your doctor or pharmacist if you are not sure how much medicine to use. ¨ Slowly give the medicine into your child's mouth. Point the dropper so the medicine goes toward the inner cheek. · Extended-release tablet: Swallow the extended-release tablet whole. Do not crush, break, or chew it. Take this medicine with a full glass of water. · Use only the brand of medicine your doctor prescribed. Other brands may not work the same way. · Missed dose: Take a dose as soon as you remember. If it is almost time for your next dose, wait until then and take a regular dose. Do not take extra medicine to make up for a missed dose. · Store the medicine in a closed container at room temperature, away from heat, moisture, and direct light. Drugs and Foods to Avoid: Ask your doctor or pharmacist before using any other medicine, including over-the-counter medicines, vitamins, and herbal products. · Some medicines and foods can affect how acetaminophen works. Tell your doctor if you are taking a blood thinner, such as warfarin. · Do not drink alcohol while you are using this medicine. Acetaminophen can damage your liver, and alcohol can increase this risk. Do not take acetaminophen without asking your doctor if you have 3 or more drinks of alcohol every day. Warnings While Using This Medicine: · Tell your doctor if you are pregnant or breastfeeding, or if you have kidney or liver disease. Tell your doctor if you have phenylketonuria (PKU). Some brands of acetaminophen contain aspartame, which can make PKU worse. · This medicine contains acetaminophen. Read the labels of all other medicines you are using to see if they also contain acetaminophen, or ask your doctor or pharmacist. Renaye Barger not use more than 4 grams (4,000 milligrams) total of acetaminophen in one day. For Tylenol® Extra Strength, it is not safe to take more than 3 grams (3,000 milligrams) in 1 day. · Call your doctor if your symptoms do not improve or if they get worse. · Tell any doctor or dentist who treats you that you are using this medicine. This medicine may affect certain medical test results. · Keep all medicine out of the reach of children. Never share your medicine with anyone. Possible Side Effects While Using This Medicine:  
Call your doctor right away if you notice any of these side effects: · Allergic reaction: Itching or hives, swelling in your face or hands, swelling or tingling in your mouth or throat, chest tightness, trouble breathing · Bloody or black, tarry stools · Dark urine or pale stools, nausea, vomiting, loss of appetite, severe stomach pain, yellow skin or eyes · Fever or a sore throat that lasts longer than 3 days, or pain that lasts longer than 5 days · Lightheadedness, fainting, sweating, or weakness · Unusual bleeding or bruising · Vomiting blood or material that looks like coffee grounds If you notice other side effects that you think are caused by this medicine, tell your doctor. Call your doctor for medical advice about side effects. You may report side effects to FDA at 5-934-YWA-6887 © 2017 [facility] Information is for End User's use only and may not be sold, redistributed or otherwise used for commercial purposes. The above information is an  only. It is not intended as medical advice for individual conditions or treatments. Talk to your doctor, nurse or pharmacist before following any medical regimen to see if it is safe and effective for you. Doxycycline (By mouth) Doxycycline (unv-a-XDK-kleen) Treats and prevents infections. Also used to prevent malaria and treat rosacea or severe acne. This medicine is a tetracycline antibiotic. Brand Name(s): Acticlate, Adoxa, Adoxa Kushal 1/150, Avidoxy, Avidoxy DK, BenzoDox 30 Kit, BenzoDox 60 Kit, Doryx, Doryx MPC, Monodox, Morgidox 3P048KM, Morgidox [id number] Kit, Morgidox 1x50MG, Morgidox 1x50MG Kit, Morgidox [id number] There may be other brand names for this medicine. When This Medicine Should Not Be Used: This medicine is not right for everyone. Do not use it if you had an allergic reaction to doxycycline or another tetracycline antibiotic, or if you are pregnant or breastfeeding. How to Use This Medicine:  
Capsule, Delayed Release Capsule, Long Acting Capsule, Liquid, Tablet, Delayed Release Tablet · Your doctor will tell you how much medicine to use. Do not use more than directed. · Ask your pharmacist or doctor if you need to take this medicine with or without food.  Some forms can be taken with food or milk, but others must be taken on an empty stomach. · Oracea® capsules: This medicine must be taken on an empty stomach, at least 1 hour before or 2 hours after a meal. 
· Capsule: Swallow whole. Do not break, crush, chew, or open it. · Delayed-release tablets: You may also take this medicine by sprinkling the broken tablets onto room-temperature applesauce. Swallow this mixture right away. Do not chew it. Do not store the mixture for later use. You may take this medicine with food or milk to avoid stomach upset. · Oral liquid: Shake the bottle well just before each use. Measure the oral liquid medicine with a marked measuring spoon, oral syringe, or medicine cup. · Tablets: You may take this medicine with food or milk to avoid stomach irritation. To break a tablet, hold the tablet between your thumb and index fingers close to the appropriate scored line. Then, apply enough pressure to snap the tablet segments apart. Do not use the tablet if it does not break on the scored lines. · Take all of the medicine in your prescription to clear up your infection, even if you feel better after the first few doses. · Drink plenty of fluids to avoid throat problems, if you take the capsule or tablet form. · Malaria prevention: Start taking the medicine 1 or 2 days before you travel. Take the medicine every day during your trip. Keep taking it for 4 weeks after you return. However, do not use the medicine for longer than 4 months. · Do not use this medicine for more than 9 months if you are using it for rosacea. · Use only the brand of medicine your doctor prescribed. Other brands may not work the same way. · Read and follow the patient instructions that come with this medicine. Talk to your doctor or pharmacist if you have any questions. · Missed dose: Take a dose as soon as you remember. If it is almost time for your next dose, wait until then and take a regular dose. Do not take extra medicine to make up for a missed dose. · Store the medicine in a closed container at room temperature, away from heat, moisture, and direct light. Do not freeze the oral liquid. Drugs and Foods to Avoid: Ask your doctor or pharmacist before using any other medicine, including over-the-counter medicines, vitamins, and herbal products. · Some medicines can affect how doxycycline works. Tell your doctor if you are using any of the following: ¨ Bismuth subsalicylate ¨ Acne medicines (including isotretinoin) ¨ Birth control pills ¨ Blood thinner (including warfarin) ¨ Medicine for seizures (including carbamazepine, phenobarbital, phenytoin) ¨ Medicine that contains aluminum, calcium, or iron (including an antacid or vitamin supplement) ¨ Medicine to treat psoriasis (including acitretin) ¨ Penicillin antibiotic ¨ Stomach medicine Warnings While Using This Medicine: · This medicine may cause birth defects if either partner is using it during conception or pregnancy. Tell your doctor right away if you or your partner becomes pregnant. Birth control pills may not work as well when used with this medicine. Use a second form of birth control to keep from getting pregnant. · Tell your doctor if you have kidney disease, liver disease, asthma, or an allergy to sulfites. Tell your doctor if you had surgery on your stomach, or if you have a history of yeast infections. · This medicine may cause the following problems: ¨ Permanent change in tooth color (in children younger than 6years old) ¨ Increased pressure inside the head ¨ Yeast infection ¨ Immune system problems · This medicine can cause diarrhea. Call your doctor if the diarrhea becomes severe, does not stop, or is bloody. Do not take any medicine to stop diarrhea until you have talked to your doctor. Diarrhea can occur 2 months or more after you stop taking this medicine. · This medicine may make your skin more sensitive to sunlight. Wear sunscreen. Do not use sunlamps or tanning beds. · Tell any doctor or dentist who treats you that you are using this medicine. This medicine may affect certain medical test results. · Call your doctor if your symptoms do not improve or if they get worse. · Your doctor will do lab tests at regular visits to check on the effects of this medicine. Keep all appointments. · Keep all medicine out of the reach of children. Never share your medicine with anyone. Possible Side Effects While Using This Medicine:  
Call your doctor right away if you notice any of these side effects: · Allergic reaction: Itching or hives, swelling in your face or hands, swelling or tingling in your mouth or throat, chest tightness, trouble breathing · Blistering, peeling, red skin rash · Burning, pain, or irritation in your upper stomach or throat · Diarrhea that may contain blood · Fever, chills, cough, runny or stuffy nose, sore throat, body aches · Joint pain, fever, rash, and unusual tiredness or weakness · Severe headache, dizziness, vision changes · Sudden and severe stomach pain, nausea, vomiting, lightheadedness If you notice these less serious side effects, talk with your doctor: · Darkening of your skin, scars, teeth, or gums · Sores or white patches on your lips, mouth, or throat If you notice other side effects that you think are caused by this medicine, tell your doctor. Call your doctor for medical advice about side effects. You may report side effects to FDA at 5-164-FDA-8878 © 2017 [address] Information is for End User's use only and may not be sold, redistributed or otherwise used for commercial purposes. The above information is an  only. It is not intended as medical advice for individual conditions or treatments. Talk to your doctor, nurse or pharmacist before following any medical regimen to see if it is safe and effective for you. Ibuprofen (By mouth) Ibuprofen (eye-bue-PROE-fen) Treats pain and fever. This medicine is an NSAID. Brand Name(s): Advil, Advil Children's, Advil Liqui-Gels, Advil Migraine, All-Purpose First Aid Kit, Children's Ibuprofen, Children's Motrin, Comfort Pac, Concentrated Motrin Infants' Drops, Equate Ibuprofen Pastor Strength, Genpril, [facility] Ibuprofen Infants', [facility] Children's Ibuprofen, [facility] Ibuprofen, [facility] Ibuprofen Pastor Strength There may be other brand names for this medicine. When This Medicine Should Not Be Used: This medicine is not right for everyone. Do not use if you had an allergic reaction (including asthma) to ibuprofen, aspirin, or another NSAID, or right before or after heart surgery. How to Use This Medicine:  
Capsule, Liquid Filled Capsule, Suspension, Tablet, Chewable Tablet · Your doctor will tell you how much medicine to use. Do not use more than directed. · Prescription ibuprofen should come with a Medication Guide. Ask your pharmacist for the Medication Guide if you do not have one. · Follow the instructions on the medicine label if you are using this medicine without a prescription. · Take this medicine with food or milk if it upsets your stomach. · Oral liquid: Shake well just before using. Measure with a marked measuring spoon, oral syringe, or medicine cup. · Chewable tablet: Chew completely before you swallow it. Then drink some water to make sure you swallow all of the medicine. · For Children: Ask your pharmacist if you are not sure how much medicine to give a child. The dose is usually based on weight, not age. Never give more medicine than directed. · For Adults: Do not take more than 6 pills in 1 day (24 hours) unless your doctor tells you to. · Missed dose: If you take this medicine on a regular basis and miss a dose, take it as soon as you can.  If it is almost time for your next dose, wait until then to use the medicine and skip the missed dose. Do not use extra medicine to make up for a missed dose. · Store the medicine in a closed container at room temperature, away from heat, moisture, and direct light. Do not freeze the oral liquid. Drugs and Foods to Avoid: Ask your doctor or pharmacist before using any other medicine, including over-the-counter medicines, vitamins, and herbal products. · Some foods and medicine can affect how ibuprofen works. Tell your doctor if you are also using lithium, methotrexate, a blood thinner (such as warfarin), a steroid medicine (such as hydrocortisone, prednisolone, prednisone), a diuretic (water pill), or an ACE inhibitor blood pressure medicine. · Do not use any other NSAID medicine unless your doctor says it is okay. Some other NSAIDs are aspirin, diclofenac, naproxen, or celecoxib. · Do not drink alcohol while you are using this medicine. Warnings While Using This Medicine: · Tell your doctor if you are pregnant or breastfeeding. Do not use this medicine during the later part of pregnancy. · Tell your doctor if you have kidney disease, liver disease, asthma, lupus or a similar connective tissue disease, or a history of ulcers or other digestion problems. Tell your doctor if you smoke or have heart or blood circulation problems, including high blood pressure, heart failure (CHF), or bleeding problems. · This medicine may cause the following problems: ¨ Bleeding and ulcers in the stomach or intestines ¨ Higher risk of heart attack or stroke ¨ Liver damage ¨ Kidney damage ¨ Vision problems · Call your doctor if symptoms get worse, pain lasts more than 10 days, or fever lasts more than 3 days. · This medicine might contain sugar or phenylalanine (aspartame). · Tell any doctor or dentist who treats you that you are using this medicine. · Keep all medicine out of the reach of children. Never share your medicine with anyone. Possible Side Effects While Using This Medicine:  
Call your doctor right away if you notice any of these side effects: · Allergic reaction: Itching or hives, swelling in your face or hands, swelling or tingling in your mouth or throat, chest tightness, trouble breathing · Blistering, peeling, or red skin rash · Change in how much or how often you urinate · Chest pain that may spread to your arms, jaw, back, or neck, trouble breathing, nausea, unusual sweating, faintness · Chest pain, trouble breathing, weakness on one side of your body, severe headache, trouble seeing or talking, pain in your lower leg · Dark urine or pale stools, nausea, vomiting, loss of appetite, stomach pain, yellow skin or eyes · Fever, neck pain, stiff neck · Severe stomach pain, vomiting blood, bloody or black, tarry stools · Swelling in your hands, ankles, or feet, rapid weight gain · Trouble seeing, blind spots, change in how you see colors · Unusual bleeding, bruising, or weakness If you notice these less serious side effects, talk with your doctor: · Constipation, diarrhea, gas, mild upset stomach · Dizziness, headache, ringing in the ears If you notice other side effects that you think are caused by this medicine, tell your doctor. Call your doctor for medical advice about side effects. You may report side effects to FDA at 7-413-FDA-2884 © 2017 [address] Information is for End User's use only and may not be sold, redistributed or otherwise used for commercial purposes. The above information is an  only. It is not intended as medical advice for individual conditions or treatments. Talk to your doctor, nurse or pharmacist before following any medical regimen to see if it is safe and effective for you. Metronidazole (By mouth) Metronidazole (met-ever-NAYELI-da-zole) Treats bacterial infections. Brand Name(s): Flagyl, Flagyl 375 There may be other brand names for this medicine. When This Medicine Should Not Be Used: This medicine is not right for everyone. Do not use if you had an allergic reaction to metronidazole or similar medicines. Do not use this medicine to treat trichomoniasis if you are in the first 3 months of pregnancy. How to Use This Medicine:  
Capsule, Tablet, Long Acting Tablet · Take this medicine as directed, and take it at the same time each day. · Capsule or Tablet: Take with food or milk to avoid stomach upset. · Extended-release tablet: Take it on an empty stomach, 1 hour before or 2 hours after a meal. 
· Swallow the extended-release tablet whole. Do not crush, break, or chew it. · Take all of the medicine in your prescription to clear up your infection, even if you feel better after the first few doses. · Missed dose: Take a dose as soon as you remember. If it is almost time for your next dose, wait until then and take a regular dose. Do not take extra medicine to make up for a missed dose. · Store the medicine in a closed container at room temperature, away from heat, moisture, and direct light. Drugs and Foods to Avoid: Ask your doctor or pharmacist before using any other medicine, including over-the-counter medicines, vitamins, and herbal products. · Do not use this medicine if you have taken disulfiram within the last 2 weeks. Do not drink alcohol or use medicine that contains alcohol or propylene glycol while using this medicine and for at least 3 days after you have finished metronidazole treatment. · Some foods and medicines can affect how metronidazole works. Tell your doctor if you are using busulfan, cimetidine, lithium, phenobarbital, phenytoin, or warfarin or another blood thinner. Warnings While Using This Medicine: · Tell your doctor if you are pregnant or breastfeeding, or if you have kidney disease, liver disease, blood or bone marrow problems, oral thrush, yeast infection, or a history of seizures. · This medicine may cause the following problems: 
¨ Brain or nervous system problems, including seizures, meningitis, or vision problems · Trichomoniasis treatment:  Your doctor may want to also treat your sexual partner, even if he or she has no symptoms. Also, you may want to use a condom during sexual intercourse. These measures will help keep you from getting the infection back again from your partner. If you have any questions, ask your doctor. · Call your doctor if your symptoms do not improve or if they get worse. · Your doctor will do lab tests at regular visits to check on the effects of this medicine. Keep all appointments. · Tell any doctor or dentist who treats you that you are using this medicine. This medicine may affect certain medical test results. · Keep all medicine out of the reach of children. Never share your medicine with anyone. Possible Side Effects While Using This Medicine:  
Call your doctor right away if you notice any of these side effects: · Allergic reaction: Itching or hives, swelling in your face or hands, swelling or tingling in your mouth or throat, chest tightness, trouble breathing · Confusion, drowsiness, fever, headache, loss of appetite, nausea or vomiting, stiff neck or back · Dizziness, problems with muscle control, clumsiness, shakiness, trouble talking · Fever, chills, cough, sore throat, and body aches · Numbness, tingling, or burning pain in your hands, arms, legs, or feet · Seizures If you notice these less serious side effects, talk with your doctor: · Mild nausea · Unusual or unpleasant taste in your mouth If you notice other side effects that you think are caused by this medicine, tell your doctor. Call your doctor for medical advice about side effects. You may report side effects to FDA at 3-091-FDA-9039 © 2017 [address] Information is for End User's use only and may not be sold, redistributed or otherwise used for commercial purposes. The above information is an  only. It is not intended as medical advice for individual conditions or treatments. Talk to your doctor, nurse or pharmacist before following any medical regimen to see if it is safe and effective for you. Please provide this summary of care documentation to your next provider. Signatures-by signing, you are acknowledging that this After Visit Summary has been reviewed with you and you have received a copy. Patient Signature:  ____________________________________________________________ Date:  ____________________________________________________________  
  
[facility] Provider Signature:  ____________________________________________________________ Date:  ____________________________________________________________

## 2018-02-12 NOTE — ED NOTES
Pt up ambulating to bathroom with steady gait and no assist, pt appears to tolerated ambulation well.

## 2018-02-12 NOTE — IP AVS SNAPSHOT
[address] [facility] 
 
 
 [address] Patient: Victor Manuel Garcia MRN: [id number] A check wesley indicates which time of day the medication should be taken. My Medications START taking these medications Instructions Each Dose to Equal  
 Morning Noon Evening Bedtime  
 acetaminophen 500 mg tablet Commonly known as:  80 Aaron Rockwell [facility] Your last dose was: Your next dose is: Take 1 Tab by mouth every six (6) hours as needed for Pain. Indications: Pain, pelvic pain 500 mg  
    
   
   
   
  
 doxycycline 100 mg capsule Commonly known as:  Shellye Holiday Your last dose was: Your next dose is: Take 1 Cap by mouth two (2) times a day for 14 days. Indications: PID  
 100 mg  
    
   
   
   
  
 ibuprofen 600 mg tablet Commonly known as:  MOTRIN Your last dose was: Your next dose is: Take 1 Tab by mouth every six (6) hours as needed for Pain. Indications: pelvic pain 600 mg  
    
   
   
   
  
 metroNIDAZOLE 500 mg tablet Commonly known as:  FLAGYL Your last dose was: Your next dose is: Take 1 Tab by mouth every twelve (12) hours for 14 days. Indications: PID  
 500 mg CONTINUE taking these medications Instructions Each Dose to Equal  
 Morning Noon Evening Bedtime  
 ondansetron 8 mg disintegrating tablet Commonly known as:  ZOFRAN ODT Your last dose was: Your next dose is: Take 1 Tab by mouth every eight (8) hours as needed for Nausea. 8 mg  
    
   
   
   
  
 traZODone 100 mg tablet Commonly known as:  Adam Vargas Your last dose was: Your next dose is: Take 100 mg by mouth nightly. 100 mg  
    
   
   
   
  
 VISTARIL 50 mg capsule Generic drug:  hydrOXYzine pamoate Your last dose was: Your next dose is: Take 100 mg by mouth three (3) times daily as needed for Itching. 100 mg ZOLOFT 100 mg tablet Generic drug:  sertraline Your last dose was: Your next dose is: Take  by mouth daily. STOP taking these medications HYDROcodone-acetaminophen 5-325 mg per tablet Commonly known as:  Zilphia Deck Where to Get Your Medications These medications were sent to [address] [place] [address] 14. [address] [facility] [address] Phone:  [phone]  
  acetaminophen 500 mg tablet  
 doxycycline 100 mg capsule  
 ibuprofen 600 mg tablet  
 metroNIDAZOLE 500 mg tablet

## 2018-02-12 NOTE — H&P
Gynecology History and Physical    Name: Sha Blanc MRN: [id number] SSN: [id number]    YOB: 1985  Age: 29 y.o. Sex: female       Subjective:      Chief complaint:  Lower abdominal pain, nausea, fever and chills    Petra Gonzales is a 29 y.o.  female with a history of pelvic inflammatory disease s/p recent outpatient tx for pylosalpinx in 2017 with doxycycline and flagyl. She presents with worsening lower abdominal pain and fevers and chills 1-2days. Pt states she checked her temperature at home last night and it was 101.3. Currently denies fevers, +chills. She is also c/o nausea/vomiting. Vomited x1 this and and low appetite. Pt has been seen multiple times in the ED for suspected PID her most recent visit in 2017 showed bilateral pylosalpinx measuring 2cm each. She was treated outpatient with two weeks of antibiotics which patient states she took the whole regimen. She was supposed to follow up with [facility] but never followed up because pt states she felt better. Her ultrasound in the ed showed Large fluid-filled tubular lesion with thickened wall in the right adnexa, compatible with chronic  pyosalpinx, unchanged from prior exam on 2017. OB History      Para Term  AB Living    2 1 1  1 1    SAB TAB Ectopic Molar Multiple Live Births    1     1        Past Medical History:   Diagnosis Date    Adnexal mass 2017    U/S Result: There is a enlarged, tubular structure within the right adnexa, possibly a dilated fallopian tube. Complex hydrosalpinx or pyosalpinx could be considered. Tubo-ovarian abscess is not entirely excluded.  Anxiety     BV (bacterial vaginosis)     Depression     Emotional instability     Normocytic anemia      No past surgical history on file. Social History     Occupational History    Not on file.      Social History Main Topics    Smoking status: Current Every Day Smoker     Packs/day: 1.50     Types: Cigarettes    Smokeless tobacco: Never Used    Alcohol use No    Drug use: Yes     Special: Marijuana    Sexual activity: Yes     Partners: Male     Birth control/ protection: None     Family History   Problem Relation Age of Onset    Hypertension Maternal Grandmother     Diabetes Paternal Grandmother     Diabetes Maternal Aunt     Diabetes Paternal Uncle     Hypertension Maternal Uncle         Allergies   Allergen Reactions    Shellfish Derived Anaphylaxis     Prior to Admission medications    Medication Sig Start Date End Date Taking? Authorizing Provider   HYDROcodone-acetaminophen (NORCO) 5-325 mg per tablet Take 1 Tab by mouth every four (4) hours as needed for Pain. Max Daily Amount: 6 Tabs. 12/21/17   Gerlean JUAN ALBERTO Hollis   ondansetron (ZOFRAN ODT) 8 mg disintegrating tablet Take 1 Tab by mouth every eight (8) hours as needed for Nausea. 12/21/17   Gerleсветлана Hollis PA-C   sertraline (ZOLOFT) 100 mg tablet Take  by mouth daily. Elle Torrez MD   hydrOXYzine (VISTARIL) 50 mg capsule Take 100 mg by mouth three (3) times daily as needed for Itching. Elle Torrez MD   traZODone (DESYREL) 100 mg tablet Take 100 mg by mouth nightly. Elle Torrez MD        Review of Systems:  A comprehensive review of systems was negative except for that written in the History of Present Illness. Objective:     Vitals:    02/12/18 1051   BP: 124/74   Pulse: 90   Resp: 16   Temp: 97.9 °F (36.6 °C)   SpO2: 97%   Weight: 163 lb (73.9 kg)   Height: 5' 9\" (1.753 m)       Physical Exam:  Patient without distress. Abdomen: soft, ND, TTP LLQ no rebound or guarding, +BS  External Genitalia: normal general appearance  Vagina: normal mucosa without prolapse or lesions and thick dark yellow discharge   Cervix: normal appearance, +CMT  Adnexa: Left adenxal tenderness  Uterus: tender, ~8wks size  Pelvic and transvaginal ultrasound     HISTORY: Generalized pelvic pain intermittently for several months.  History of  hydrosalpinx     COMPARISON: 9/11/17     FINDINGS: Transabdominal pelvic ultrasound was initially performed. The uterus  appears normal in contour and size, measuring 8.8 x 4.9 x 4.6 cm. There is no  interuterine mass identified to suggest fibroids. The endometrial stripe and  ovaries are better visualized by transvaginal approach.     Transvaginal ultrasound was then followed. Again, no focal myometrial  abnormality seen. The endometrial stripe appears uniform and normal, measuring  7.8 mm.     Both ovaries are visualized. The right ovary measures 2.8 x 2.5 x 3.1 cm. The  left ovary measures 3.2 x 2.5 x 3.5 cm. Color flow and spectral doppler  analysis were also performed to both ovaries. There is normal flow identified  in both ovaries. The fluid-filled large tubular lesion is again seen in right  adnexa with no significant interval change compared to the prior ultrasound and  roughly measures 2.4 cm in diameter and 5.6 cm in length. Thickened wall of the  lesion measures 4 mm. Small surrounding fluid present. Within the left ovary,  small complex area noted and measures 1.5 x 2.0 cm, similar to the prior study  as more likely partially collapsed follicle. Small free fluid identified in the  cul-de-sac.     IMPRESSION  IMPRESSION:     1. Large fluid-filled tubular lesion with thickened wall and surrounding free  fluid again noted in right adnexa, compatible with chronic  pyosalpinx. No  significant interval change noted compared to the prior ultrasound in 9/17.     2. Stable small left ovarian cyst.     3. Small free pelvic fluid. Assessment:     [id number] with h/o PID now with evidence acute on chronic PID on exam s/p failed outpatient management. Currently, AFVSS. Plan:      Will admit to Gyn service for IV abx  Start on Amp/Gent/Clinda  F/u GC/CT/TV   F/u vaginal cultures  Wet prep-->+BV-->on IV clindamycin  Tylenol prn for pain  VS q 4hrs  Regular diet  AM CBC/CMP        Signed By:  Ceferino Sullivan MD Cordelia     February 12, 2018 5:38 PM

## 2018-02-13 LAB
ALBUMIN SERPL-MCNC: 2.9 G/DL (ref 3.4–5)
ALBUMIN/GLOB SERPL: 0.9 {RATIO} (ref 0.8–1.7)
ALP SERPL-CCNC: 68 U/L (ref 45–117)
ALT SERPL-CCNC: 10 U/L (ref 13–56)
ANION GAP SERPL CALC-SCNC: 4 MMOL/L (ref 3–18)
AST SERPL-CCNC: 10 U/L (ref 15–37)
BASOPHILS # BLD: 0 K/UL (ref 0–0.1)
BASOPHILS NFR BLD: 0 % (ref 0–2)
BILIRUB SERPL-MCNC: 0.7 MG/DL (ref 0.2–1)
BUN SERPL-MCNC: 6 MG/DL (ref 7–18)
BUN/CREAT SERPL: 8 (ref 12–20)
C TRACH RRNA SPEC QL NAA+PROBE: NEGATIVE
CALCIUM SERPL-MCNC: 8.2 MG/DL (ref 8.5–10.1)
CHLORIDE SERPL-SCNC: 105 MMOL/L (ref 100–108)
CO2 SERPL-SCNC: 27 MMOL/L (ref 21–32)
CREAT SERPL-MCNC: 0.71 MG/DL (ref 0.6–1.3)
DIFFERENTIAL METHOD BLD: ABNORMAL
EOSINOPHIL # BLD: 0 K/UL (ref 0–0.4)
EOSINOPHIL NFR BLD: 0 % (ref 0–5)
ERYTHROCYTE [DISTWIDTH] IN BLOOD BY AUTOMATED COUNT: 15.3 % (ref 11.6–14.5)
GENTAMICIN SERPL-MCNC: 0.6 UG/ML (ref 0.5–10)
GLOBULIN SER CALC-MCNC: 3.3 G/DL (ref 2–4)
GLUCOSE SERPL-MCNC: 86 MG/DL (ref 74–99)
HCT VFR BLD AUTO: 33.2 % (ref 35–45)
HGB BLD-MCNC: 10.8 G/DL (ref 12–16)
LYMPHOCYTES # BLD: 2.5 K/UL (ref 0.9–3.6)
LYMPHOCYTES NFR BLD: 32 % (ref 21–52)
MCH RBC QN AUTO: 29.4 PG (ref 24–34)
MCHC RBC AUTO-ENTMCNC: 32.5 G/DL (ref 31–37)
MCV RBC AUTO: 90.5 FL (ref 74–97)
MONOCYTES # BLD: 1.1 K/UL (ref 0.05–1.2)
MONOCYTES NFR BLD: 14 % (ref 3–10)
N GONORRHOEA RRNA SPEC QL NAA+PROBE: NEGATIVE
NEUTS SEG # BLD: 4.2 K/UL (ref 1.8–8)
NEUTS SEG NFR BLD: 54 % (ref 40–73)
PLATELET # BLD AUTO: 244 K/UL (ref 135–420)
PMV BLD AUTO: 11.2 FL (ref 9.2–11.8)
POTASSIUM SERPL-SCNC: 3.9 MMOL/L (ref 3.5–5.5)
PROT SERPL-MCNC: 6.2 G/DL (ref 6.4–8.2)
RBC # BLD AUTO: 3.67 M/UL (ref 4.2–5.3)
SODIUM SERPL-SCNC: 136 MMOL/L (ref 136–145)
SPECIMEN SOURCE: NORMAL
WBC # BLD AUTO: 7.8 K/UL (ref 4.6–13.2)

## 2018-02-13 PROCEDURE — 74011000258 HC RX REV CODE- 258: Performed by: EMERGENCY MEDICINE

## 2018-02-13 PROCEDURE — 74011250637 HC RX REV CODE- 250/637: Performed by: OBSTETRICS & GYNECOLOGY

## 2018-02-13 PROCEDURE — 74011250636 HC RX REV CODE- 250/636: Performed by: EMERGENCY MEDICINE

## 2018-02-13 PROCEDURE — 36415 COLL VENOUS BLD VENIPUNCTURE: CPT | Performed by: OBSTETRICS & GYNECOLOGY

## 2018-02-13 PROCEDURE — 85025 COMPLETE CBC W/AUTO DIFF WBC: CPT | Performed by: OBSTETRICS & GYNECOLOGY

## 2018-02-13 PROCEDURE — 80170 ASSAY OF GENTAMICIN: CPT | Performed by: EMERGENCY MEDICINE

## 2018-02-13 PROCEDURE — 65270000029 HC RM PRIVATE

## 2018-02-13 PROCEDURE — 74011250636 HC RX REV CODE- 250/636: Performed by: OBSTETRICS & GYNECOLOGY

## 2018-02-13 PROCEDURE — 74011000250 HC RX REV CODE- 250: Performed by: EMERGENCY MEDICINE

## 2018-02-13 PROCEDURE — 80053 COMPREHEN METABOLIC PANEL: CPT | Performed by: OBSTETRICS & GYNECOLOGY

## 2018-02-13 RX ORDER — OXYCODONE AND ACETAMINOPHEN 5; 325 MG/1; MG/1
1 TABLET ORAL
Status: DISCONTINUED | OUTPATIENT
Start: 2018-02-13 | End: 2018-02-15 | Stop reason: HOSPADM

## 2018-02-13 RX ORDER — OXYCODONE AND ACETAMINOPHEN 5; 325 MG/1; MG/1
2 TABLET ORAL
Status: DISCONTINUED | OUTPATIENT
Start: 2018-02-13 | End: 2018-02-15 | Stop reason: HOSPADM

## 2018-02-13 RX ADMIN — OXYCODONE HYDROCHLORIDE AND ACETAMINOPHEN 2 TABLET: 5; 325 TABLET ORAL at 14:25

## 2018-02-13 RX ADMIN — AMPICILLIN 2 G: 2 INJECTION, POWDER, FOR SOLUTION INTRAVENOUS at 01:47

## 2018-02-13 RX ADMIN — CLINDAMYCIN PHOSPHATE 900 MG: 150 INJECTION, SOLUTION INTRAVENOUS at 00:05

## 2018-02-13 RX ADMIN — SODIUM CHLORIDE 100 ML/HR: 900 INJECTION, SOLUTION INTRAVENOUS at 20:42

## 2018-02-13 RX ADMIN — OXYCODONE HYDROCHLORIDE AND ACETAMINOPHEN 1 TABLET: 5; 325 TABLET ORAL at 03:38

## 2018-02-13 RX ADMIN — Medication 10 ML: at 13:23

## 2018-02-13 RX ADMIN — Medication 10 ML: at 06:56

## 2018-02-13 RX ADMIN — CLINDAMYCIN PHOSPHATE 900 MG: 150 INJECTION, SOLUTION INTRAVENOUS at 08:57

## 2018-02-13 RX ADMIN — AMPICILLIN 2 G: 2 INJECTION, POWDER, FOR SOLUTION INTRAVENOUS at 06:56

## 2018-02-13 RX ADMIN — Medication 10 ML: at 22:07

## 2018-02-13 RX ADMIN — GENTAMICIN SULFATE 331.2 MG: 40 INJECTION, SOLUTION INTRAMUSCULAR; INTRAVENOUS at 20:41

## 2018-02-13 RX ADMIN — AMPICILLIN 2 G: 2 INJECTION, POWDER, FOR SOLUTION INTRAVENOUS at 13:14

## 2018-02-13 RX ADMIN — CLINDAMYCIN PHOSPHATE 900 MG: 150 INJECTION, SOLUTION INTRAVENOUS at 22:57

## 2018-02-13 RX ADMIN — AMPICILLIN 2 G: 2 INJECTION, POWDER, FOR SOLUTION INTRAVENOUS at 19:00

## 2018-02-13 RX ADMIN — Medication 10 ML: at 00:09

## 2018-02-13 RX ADMIN — OXYCODONE HYDROCHLORIDE AND ACETAMINOPHEN 1 TABLET: 5; 325 TABLET ORAL at 08:55

## 2018-02-13 RX ADMIN — OXYCODONE HYDROCHLORIDE AND ACETAMINOPHEN 2 TABLET: 5; 325 TABLET ORAL at 22:55

## 2018-02-13 RX ADMIN — CLINDAMYCIN PHOSPHATE 900 MG: 150 INJECTION, SOLUTION INTRAVENOUS at 14:27

## 2018-02-13 RX ADMIN — OXYCODONE HYDROCHLORIDE AND ACETAMINOPHEN 2 TABLET: 5; 325 TABLET ORAL at 19:00

## 2018-02-13 NOTE — ROUTINE PROCESS
TRANSFER - OUT REPORT:    Verbal report given to [facility] RN(name) on Celeste Sewell  being transferred to [address] [place] [address](unit) for routine progression of care       Report consisted of patients Situation, Background, Assessment and   Recommendations(SBAR). Information from the following report(s) SBAR, Kardex, ED Summary, [facility] and Recent Results was reviewed with the receiving nurse. Lines:   Peripheral IV 02/12/18 Right Antecubital (Active)   Site Assessment Clean, dry, & intact 2/12/2018  2:48 PM   Phlebitis Assessment 0 2/12/2018  2:48 PM   Infiltration Assessment 0 2/12/2018  2:48 PM   Dressing Type Tape;Transparent 2/12/2018  2:48 PM   Hub Color/Line Status Flushed;Patent;Pink 2/12/2018  2:48 PM        Opportunity for questions and clarification was provided. Patient transported with:   All of personal belongings

## 2018-02-13 NOTE — CDMP QUERY
Please clarify if this patient is being treated/managed for:    => bacterial vaginosis (BV)   => Other Explanation of clinical findings  => Unable to Determine (no explanation of clinical findings)    The medical record reflects the following:    ---> Risk: 29 yr old female with PMH PID; BV;     ---> Clinical Indicators:     * 2-12-18 H&P: \"Plan:. Levorn Lux Levorn Lux Wet prep-->+BV-->on IV clindamycin. ..:\"     * 2-12-18 Labs: \". .. Wet prep MANY CLUE CELLS PRESENT      Final                            Wet prep  NO YEAST SEEN       Final                           Wet prep  NO TRICHOMONAS SEEN      Final\"    ---> Treatment: labs, cultures, abx    Please clarify and document your clinical opinion in the progress notes and discharge summary including the definitive and/or presumptive diagnosis, (suspected or probable), related to the above clinical findings. Please include clinical findings supporting your diagnosis. If you DECLINE this query or would like to communicate with [facility], please utilize the \"[employer] message box\" at the TOP of the Progress Note on the right.       Thank you,  Angélica Bui, SHAMAR  [phone]

## 2018-02-13 NOTE — PROGRESS NOTES
Gynecology Progress Note      Procedure: * No surgery found *    Pt seen and examined this evening, after transfer to floor. She is without significant complaints. Pain controlled on current medication. Voiding without difficulty.          Current Facility-Administered Medications   Medication Dose Route Frequency    clindamycin phosphate 900 mg in 0.9% sodium chloride (MBP/ADV) 100 mL ADV  900 mg IntraVENous Q8H    ampicillin (OMNIPEN) 2 g in 0.9% sodium chloride (MBP/ADV) 100 mL MBP  2 g IntraVENous Q6H    sodium chloride (NS) flush 5-10 mL  5-10 mL IntraVENous Q8H    gentamicin (GARAMYCIN) 331.2 mg in 0.9% sodium chloride 100 mL IVPB  5 mg/kg (Ideal) IntraVENous Q24H    0.9% sodium chloride infusion  100 mL/hr IntraVENous CONTINUOUS    [START ON 2018] Gentamicin random level  1 Each Other Rx Dosing/Monitoring       Vitals:  Visit Vitals    /72    Pulse 68    Temp 98 °F (36.7 °C)    Resp 18    Ht 5' 9\" (1.753 m)    Wt 163 lb (73.9 kg)    SpO2 100%    BMI 24.07 kg/m2     Temp (24hrs), Av °F (36.7 °C), Min:97.9 °F (36.6 °C), Max:98.1 °F (36.7 °C)      Last 24hr Input/Output:  No intake or output data in the 24 hours ending 18 2173       Exam:  General: pt asleep in bed, appears comfortable     Abdomen: soft, ND, mild TTP in LLQ, no rebound or guarding, unchanged from admission     Extremities: no calf tenderness  Labs:   Lab Results   Component Value Date/Time    WBC 8.6 2018 02:44 PM    WBC 11.8 2017 12:00 PM    WBC 11.3 2017 11:56 AM    HGB 11.4 (L) 2018 02:44 PM    HGB 13.0 2017 12:00 PM    HGB 12.4 2017 11:56 AM    HCT 34.9 (L) 2018 02:44 PM    HCT 38.6 2017 12:00 PM    HCT 37.1 2017 11:56 AM    PLATELET 429  02:44 PM    PLATELET 279  12:00 PM    PLATELET 367  11:56 AM       Recent Results (from the past 24 hour(s))   URINALYSIS W/ RFLX MICROSCOPIC    Collection Time: 18 10:52 AM   Result Value Ref Range    Color YELLOW      Appearance TURBID      Specific gravity 1.024 1.005 - 1.030      pH (UA) 7.5 5.0 - 8.0      Protein TRACE (A) NEG mg/dL    Glucose NEGATIVE  NEG mg/dL    Ketone NEGATIVE  NEG mg/dL    Bilirubin NEGATIVE  NEG      Blood NEGATIVE  NEG      Urobilinogen 1.0 0.2 - 1.0 EU/dL    Nitrites NEGATIVE  NEG      Leukocyte Esterase SMALL (A) NEG     HCG URINE, QL    Collection Time: 02/12/18 10:52 AM   Result Value Ref Range    HCG urine, QL NEGATIVE  NEG     URINE MICROSCOPIC ONLY    Collection Time: 02/12/18 10:52 AM   Result Value Ref Range    WBC 5 to 7 0 - 4 /hpf    Epithelial cells 2+ 0 - 5 /lpf    Bacteria 3+ (A) NEG /hpf   CBC WITH AUTOMATED DIFF    Collection Time: 02/12/18  2:44 PM   Result Value Ref Range    WBC 8.6 4.6 - 13.2 K/uL    RBC 4.01 (L) 4.20 - 5.30 M/uL    HGB 11.4 (L) 12.0 - 16.0 g/dL    HCT 34.9 (L) 35.0 - 45.0 %    MCV 87.0 74.0 - 97.0 FL    MCH 28.4 24.0 - 34.0 PG    MCHC 32.7 31.0 - 37.0 g/dL    RDW 15.2 (H) 11.6 - 14.5 %    PLATELET 977 095 - 624 K/uL    MPV 11.0 9.2 - 11.8 FL    NEUTROPHILS 63 40 - 73 %    LYMPHOCYTES 26 21 - 52 %    MONOCYTES 11 (H) 3 - 10 %    EOSINOPHILS 0 0 - 5 %    BASOPHILS 0 0 - 2 %    ABS. NEUTROPHILS 5.5 1.8 - 8.0 K/UL    ABS. LYMPHOCYTES 2.2 0.9 - 3.6 K/UL    ABS. MONOCYTES 0.9 0.05 - 1.2 K/UL    ABS. EOSINOPHILS 0.0 0.0 - 0.4 K/UL    ABS.  BASOPHILS 0.0 0.0 - 0.1 K/UL    DF AUTOMATED     WET PREP    Collection Time: 02/12/18  4:20 PM   Result Value Ref Range    Special Requests: NO SPECIAL REQUESTS      Wet prep MANY  CLUE CELLS PRESENT        Wet prep NO YEAST SEEN      Wet prep NO TRICHOMONAS SEEN     METABOLIC PANEL, COMPREHENSIVE    Collection Time: 02/12/18  4:20 PM   Result Value Ref Range    Sodium 138 136 - 145 mmol/L    Potassium 3.9 3.5 - 5.5 mmol/L    Chloride 106 100 - 108 mmol/L    CO2 27 21 - 32 mmol/L    Anion gap 5 3.0 - 18 mmol/L    Glucose 83 74 - 99 mg/dL    BUN 5 (L) 7.0 - 18 MG/DL    Creatinine 0.68 0.6 - 1.3 MG/DL BUN/Creatinine ratio 7 (L) 12 - 20      GFR est AA >60 >60 ml/min/1.73m2    GFR est non-AA >60 >60 ml/min/1.73m2    Calcium 8.2 (L) 8.5 - 10.1 MG/DL    Bilirubin, total 0.5 0.2 - 1.0 MG/DL    ALT (SGPT) 11 (L) 13 - 56 U/L    AST (SGOT) 11 (L) 15 - 37 U/L    Alk.  phosphatase 72 45 - 117 U/L    Protein, total 6.5 6.4 - 8.2 g/dL    Albumin 3.0 (L) 3.4 - 5.0 g/dL    Globulin 3.5 2.0 - 4.0 g/dL    A-G Ratio 0.9 0.8 - 1.7             Assesment/Plan: 30  admitted for acute on chronic PID, s/p failed outpatient management, no acute abdomen on exam. AFVSS  Continue Amp/Gent/ Clinda  Continue IVF  Percocet 1 tab q6hrs prn pain  Advance diet as tolerated  F/u GC/CT  F/u vaginal cultures  Repeat CBC, CMP in the am  udip: likely dirty, pt without urinary symptoms  Kate Dixon MD  /[id number]:01 PM

## 2018-02-13 NOTE — ROUTINE PROCESS
Bedside and Verbal shift change report given to [employer], RN (oncoming nurse) by Aneta Leyden, RN (offgoing nurse). Report included the following information SBAR, Kardex, MAR and Recent Results. SITUATION:    Code Status: Full Code   Reason for Admission: PID (acute pelvic inflammatory disease)   TOA (tubo-ovarian abscess)   Abdominal pain    [facility] day: 1   Problem List:       Hospital Problems  Date Reviewed: 6/5/2017          Codes Class Noted POA    Abdominal pain ICD-10-CM: R10.9  ICD-9-CM: 789.00  2/12/2018 Unknown        PID (acute pelvic inflammatory disease) ICD-10-CM: N73.0  ICD-9-CM: 614.3  2/12/2018 Unknown        TOA (tubo-ovarian abscess) ICD-10-CM: N70.93  ICD-9-CM: 614.2  2/12/2018 Unknown              BACKGROUND:    Past Medical History:   Past Medical History:   Diagnosis Date    Adnexal mass 06/05/2017    U/S Result: There is a enlarged, tubular structure within the right adnexa, possibly a dilated fallopian tube. Complex hydrosalpinx or pyosalpinx could be considered. Tubo-ovarian abscess is not entirely excluded.                     Anxiety     BV (bacterial vaginosis)     Depression     Emotional instability     Normocytic anemia          Patient taking anticoagulants no     ASSESSMENT:    Changes in Assessment Throughout Shift: none       Patient has Central Line: no Reasons if yes: n/a     Patient has Baker Cath: no Reasons if yes: n/a      Last Vitals:     Vitals:    02/12/18 1051 02/12/18 1849 02/12/18 2119 02/13/18 0000   BP: 124/74 110/74 115/72 98/57   Pulse: 90 64 68 79   Resp: 16 18 18 18   Temp: 97.9 °F (36.6 °C) 98.1 °F (36.7 °C) 98 °F (36.7 °C) 98.7 °F (37.1 °C)   SpO2: 97% 100% 100% 100%   Weight: 73.9 kg (163 lb)      Height: 5' 9\" (1.753 m)           IV and DRAINS (will only show if present)   Peripheral IV 02/12/18 Right Antecubital-Site Assessment: Clean, dry, & intact     WOUND (if present)   Wound Type:  none   Dressing present Dressing Present : Yes   Wound Concerns/Notes:  none     PAIN    Pain Assessment    Pain Intensity 1: 2 (02/13/18 0430)    Pain Location 1: Abdomen    Pain Intervention(s) 1: Medication (see MAR)    Patient Stated Pain Goal: 0  o Interventions for Pain:  Percocet 1 tab po  o Intervention effective: yes  o Time of last intervention: 4733  o Reassessment Completed: yes      Last 3 Weights:  Last 3 Recorded Weights in this Encounter    02/12/18 1051   Weight: 73.9 kg (163 lb)     Weight change:      INTAKE/OUPUT    Current Shift: 02/13 0701 - 02/13 1900  In: 240 [P.O.:240]  Out: -     Last three shifts:       LAB RESULTS     Recent Labs      02/13/18 0420 02/12/18   1444   WBC  7.8  8.6   HGB  10.8*  11.4*   HCT  33.2*  34.9*   PLT  244  319        Recent Labs      02/13/18   0420 02/12/18   1620   NA  136  138   K  3.9  3.9   GLU  86  83   BUN  6*  5*   CREA  0.71  0.68   CA  8.2*  8.2*       RECOMMENDATIONS AND DISCHARGE PLANNING     1. Pending tests/procedures/ Plan of Care or Other Needs: antibiotics, pain control     2. Discharge plan for patient and Needs/Barriers: home    3. Estimated Discharge Date: 2/16/18 Posted on Whiteboard in [facility]: yes      4. The patient's care plan was reviewed with the oncoming nurse. \"HEALS\" SAFETY CHECK      Fall Risk    Total Score: 1    Safety Measures: Safety Measures: Bed/Chair-Wheels locked, Bed in low position, Call light within reach    A safety check occurred in the patient's room between off going nurse and oncoming nurse listed above.     The safety check included the below items  Area Items   H  High Alert Medications - Verify all high alert medication drips (heparin, PCA, etc.)   E  Equipment - Suction is set up for ALL patients (with yanker)  - Red plugs utilized for all equipment (IV pumps, etc.)  - WOWs wiped down at end of shift.  - Room stocked with oxygen, suction, and other unit-specific supplies   A  Alarms - Bed alarm is set for fall risk patients  - Ensure chair alarm is in place and activated if patient is up in a chair   L  Lines - Check IV for any infiltration  - Baker bag is empty if patient has a Baker   - Tubing and IV bags are labeled   S  Safety   - Room is clean, patient is clean, and equipment is clean. - Hallways are clear from equipment besides carts. - Fall bracelet on for fall risk patients  - Ensure room is clear and free of clutter  - Suction is set up for ALL patients (with yanker)  - Hallways are clear from equipment besides carts.    - Isolation precautions followed, supplies available outside room, sign posted     Foster Sargent RN

## 2018-02-13 NOTE — PROGRESS NOTES
NUTRITION    Nursing Referral: [facility]      RECOMMENDATIONS / PLAN:     - Continue current nutrition interventions  - Continue RD inpatient monitoring and evaluation. NUTRITION INTERVENTIONS & DIAGNOSIS:     [x] Meals/snacks: general/healthful diet    Nutrition Diagnosis:  Inadequate oral intake related to inability to tolerate po as evidenced by poor meal intake x 2 days PTA    ASSESSMENT:     Pt reported usual appetite and meal intake are good; poor x 2 days due to abdominal pain and vomiting. Appetite improving since admission. Abdominal pain and vomiting resolved. Poor meal intake today but tolerating diet; consumed 15% of lunch. Discussed adding nutrition supplement; pt declined. Denied having any food preferences. Meal intake encouraged.     Average po intake adequate to meet patients estimated nutritional needs:   [] Yes     [x] No   [] Unable to determine at this time    Diet: DIET REGULAR      Food Allergies:  shellfish  Current Appetite:   [x] Good     [x] Fair     [] Poor     [] Other:  Appetite/meal intake prior to admission:   [x] Good: usual     [] Fair     [x] Poor: x 2 days PTA     [] Other:  Feeding Limitations:  [] Swallowing difficulty    [] Chewing difficulty    [] Other:  Current Meal Intake: Patient Vitals for the past 100 hrs:   % Diet Eaten   02/13/18 1020 25 %   02/13/18 0823 0 %       BM:  2/12  Skin Integrity:  No pressure injury or wound noted  Edema:  None   Pertinent Medications: Reviewed: NS at 100 mL/hr, zofran    Recent Labs      02/13/18   0420  02/12/18   1620   NA  136  138   K  3.9  3.9   CL  105  106   CO2  27  27   GLU  86  83   BUN  6*  5*   CREA  0.71  0.68   CA  8.2*  8.2*   ALB  2.9*  3.0*   SGOT  10*  11*   ALT  10*  11*       Intake/Output Summary (Last 24 hours) at 02/13/18 1544  Last data filed at 02/13/18 1020   Gross per 24 hour   Intake             1162 ml   Output                0 ml   Net             1162 ml       Anthropometrics:  Ht Readings from Last 1 Encounters:   02/12/18 5' 9\" (1.753 m)     Last 3 Recorded Weights in this Encounter    02/12/18 1051   Weight: 73.9 kg (163 lb)     Body mass index is 24.07 kg/(m^2). Weight History:  Pt feels she lost weight PTA. Noted weight gain PTA per chart hx    Weight Metrics 2/12/2018 12/21/2017 10/17/2017 9/11/2017 9/10/2017 6/6/2017 6/5/2017   Weight 163 lb 164 lb 157 lb 157 lb 157 lb 160 lb 1.6 oz 153 lb   BMI 24.07 kg/m2 24.22 kg/m2 23.18 kg/m2 23.18 kg/m2 23.18 kg/m2 23.64 kg/m2 22.59 kg/m2        Admitting Diagnosis: PID (acute pelvic inflammatory disease)  TOA (tubo-ovarian abscess)  Abdominal pain  Pertinent PMHx: depression    Education Needs:        [x] None identified  [] Identified - Not appropriate at this time  []  Identified and addressed - refer to education log  Learning Limitations:   [x] None identified  [] Identified    Cultural, Methodist & ethnic food preferences:  [x] None identified    [] Identified and addressed     ESTIMATED NUTRITION NEEDS:     Calories: 0871-3248 kcal (MSJx1.2-1.3) based on  [x] Actual BW: 74 kg      [] IBW   Protein: 59-74 gm (0.8-1 gm/kg) based on  [x] Actual BW      [] IBW   Fluid: 1 mL/kcal     MONITORING & EVALUATION:     Nutrition Goal(s):   1. Po intake of meals will meet >75% of patient estimated nutritional needs within the next 7 days.   Outcome:  [] Met/Ongoing    []  Not Met    [x] New/Initial Goal     Monitoring:   [x] Food and beverage intake   [x] Diet order   [x] Nutrition-focused physical findings   [x] Treatment/therapy   [] Weight   [] Enteral nutrition intake        Previous Recommendations (for follow-up assessments only):     []   Implemented       []   Not Implemented (RD to address)      [] No Longer Appropriate     [] No Recommendation Made     Discharge Planning:  Regular diet   [x] Participated in care planning, discharge planning, & interdisciplinary rounds as appropriate      Vesta Baez, [address]   Pager: [phone]

## 2018-02-13 NOTE — ROUTINE PROCESS
Bedside and Verbal shift change report given to gurmeet Ta (oncoming nurse) by Erin Gonzalez (offgoing nurse). Report included the following information SBAR, Kardex, Intake/Output and MAR.

## 2018-02-13 NOTE — CDMP QUERY
Could you please clarity if there is any significance to patient's 2-12-18 UA:     Results for Marie Hoffmann (MRN [id number]) as of 2/13/2018 14:22    2/12/2018 10:52  Color: YELLOW  Appearance: TURBID  Specific gravity: 1.024  pH (UA): 7.5  Protein: TRACE (A)  Glucose: NEGATIVE  Ketone: NEGATIVE  Blood: NEGATIVE  Bilirubin: NEGATIVE  Urobilinogen: 1.0  Nitrites: NEGATIVE  Leukocyte Esterase: SMALL (A)  Epithelial cells: 2+  WBC: 5 to 7  Bacteria: 3+ (A)      Please clarify and document your clinical opinion in the progress notes and discharge summary including the definitive and/or presumptive diagnosis, (suspected or probable), related to the above clinical findings. Please include clinical findings supporting your diagnosis. If you DECLINE this query or would like to communicate with [facility], please utilize the \"[employer] message box\" at the TOP of the Progress Note on the right.       Thank you,  Janay Queen RN

## 2018-02-14 PROCEDURE — 74011250636 HC RX REV CODE- 250/636: Performed by: OBSTETRICS & GYNECOLOGY

## 2018-02-14 PROCEDURE — 74011000258 HC RX REV CODE- 258: Performed by: EMERGENCY MEDICINE

## 2018-02-14 PROCEDURE — 65270000029 HC RM PRIVATE

## 2018-02-14 PROCEDURE — 74011000250 HC RX REV CODE- 250: Performed by: EMERGENCY MEDICINE

## 2018-02-14 PROCEDURE — 74011250637 HC RX REV CODE- 250/637: Performed by: OBSTETRICS & GYNECOLOGY

## 2018-02-14 PROCEDURE — 74011250636 HC RX REV CODE- 250/636: Performed by: EMERGENCY MEDICINE

## 2018-02-14 RX ORDER — METRONIDAZOLE 500 MG/1
500 TABLET ORAL EVERY 12 HOURS
Qty: 28 TAB | Refills: 0 | Status: SHIPPED | OUTPATIENT
Start: 2018-02-14 | End: 2018-02-28

## 2018-02-14 RX ORDER — DOXYCYCLINE 100 MG/1
100 CAPSULE ORAL 2 TIMES DAILY
Qty: 28 CAP | Refills: 0 | Status: SHIPPED | OUTPATIENT
Start: 2018-02-14 | End: 2018-02-28

## 2018-02-14 RX ORDER — ACETAMINOPHEN 500 MG
500 TABLET ORAL
Qty: 30 TAB | Refills: 0 | Status: SHIPPED | OUTPATIENT
Start: 2018-02-14

## 2018-02-14 RX ORDER — IBUPROFEN 600 MG/1
600 TABLET ORAL
Qty: 30 TAB | Refills: 1 | Status: SHIPPED | OUTPATIENT
Start: 2018-02-14

## 2018-02-14 RX ADMIN — OXYCODONE HYDROCHLORIDE AND ACETAMINOPHEN 2 TABLET: 5; 325 TABLET ORAL at 09:14

## 2018-02-14 RX ADMIN — GENTAMICIN SULFATE 331.2 MG: 40 INJECTION, SOLUTION INTRAMUSCULAR; INTRAVENOUS at 22:16

## 2018-02-14 RX ADMIN — AMPICILLIN 2 G: 2 INJECTION, POWDER, FOR SOLUTION INTRAVENOUS at 05:56

## 2018-02-14 RX ADMIN — OXYCODONE HYDROCHLORIDE AND ACETAMINOPHEN 2 TABLET: 5; 325 TABLET ORAL at 22:32

## 2018-02-14 RX ADMIN — Medication 10 ML: at 22:19

## 2018-02-14 RX ADMIN — SODIUM CHLORIDE 100 ML/HR: 900 INJECTION, SOLUTION INTRAVENOUS at 09:16

## 2018-02-14 RX ADMIN — CLINDAMYCIN PHOSPHATE 900 MG: 150 INJECTION, SOLUTION INTRAVENOUS at 18:32

## 2018-02-14 RX ADMIN — AMPICILLIN 2 G: 2 INJECTION, POWDER, FOR SOLUTION INTRAVENOUS at 20:04

## 2018-02-14 RX ADMIN — OXYCODONE HYDROCHLORIDE AND ACETAMINOPHEN 2 TABLET: 5; 325 TABLET ORAL at 04:40

## 2018-02-14 RX ADMIN — Medication 10 ML: at 14:00

## 2018-02-14 RX ADMIN — Medication 10 ML: at 06:16

## 2018-02-14 RX ADMIN — CLINDAMYCIN PHOSPHATE 900 MG: 150 INJECTION, SOLUTION INTRAVENOUS at 07:15

## 2018-02-14 RX ADMIN — OXYCODONE HYDROCHLORIDE AND ACETAMINOPHEN 2 TABLET: 5; 325 TABLET ORAL at 18:26

## 2018-02-14 RX ADMIN — OXYCODONE HYDROCHLORIDE AND ACETAMINOPHEN 2 TABLET: 5; 325 TABLET ORAL at 13:40

## 2018-02-14 RX ADMIN — AMPICILLIN 2 G: 2 INJECTION, POWDER, FOR SOLUTION INTRAVENOUS at 00:15

## 2018-02-14 NOTE — PROGRESS NOTES
IV infiltrated. 1 attempt made. Patient will not allow anyone else to stick her. Called supervisor in order to get an IV on her.

## 2018-02-14 NOTE — ROUTINE PROCESS
Bedside and Verbal shift change report given to [facility] OLIVIA RN(oncoming nurse) by Chastity Melendrez RN (offgoing nurse). Report included the following information SBAR, Kardex, MAR and Recent Results. SITUATION:    Code Status: Full Code   Reason for Admission: PID (acute pelvic inflammatory disease)   TOA (tubo-ovarian abscess)   Abdominal pain    [facility] day: 2   Problem List:       Hospital Problems  Date Reviewed: 6/5/2017          Codes Class Noted POA    Abdominal pain ICD-10-CM: R10.9  ICD-9-CM: 789.00  2/12/2018 Unknown        PID (acute pelvic inflammatory disease) ICD-10-CM: N73.0  ICD-9-CM: 614.3  2/12/2018 Unknown        TOA (tubo-ovarian abscess) ICD-10-CM: N70.93  ICD-9-CM: 614.2  2/12/2018 Unknown              BACKGROUND:    Past Medical History:   Past Medical History:   Diagnosis Date    Adnexal mass 06/05/2017    U/S Result: There is a enlarged, tubular structure within the right adnexa, possibly a dilated fallopian tube. Complex hydrosalpinx or pyosalpinx could be considered. Tubo-ovarian abscess is not entirely excluded.                     Anxiety     BV (bacterial vaginosis)     Depression     Emotional instability     Normocytic anemia          Patient taking anticoagulants no     ASSESSMENT:    Changes in Assessment Throughout Shift: none     Patient has Central Line: no Reasons if yes: n/a   Patient has Baker Cath: no Reasons if yes: n/a      Last Vitals:     Vitals:    02/13/18 1332 02/13/18 1513 02/13/18 2053 02/14/18 0441   BP: 109/62 96/59 95/55 107/69   Pulse: 95 74 62 70   Resp: 18 16 18 19   Temp: 97.2 °F (36.2 °C) 98.6 °F (37 °C) 97.2 °F (36.2 °C) 98.4 °F (36.9 °C)   SpO2: 100% 98% 99%    Weight:       Height:            IV and DRAINS (will only show if present)   Peripheral IV 02/12/18 Right Antecubital-Site Assessment: Clean, dry, & intact     WOUND (if present)   Wound Type:  none   Dressing present Dressing Present : No   Wound Concerns/Notes: none     PAIN    Pain Assessment    Pain Intensity 1: 7 (02/14/18 0440)    Pain Location 1: Abdomen    Pain Intervention(s) 1: Medication (see MAR)    Patient Stated Pain Goal: 0  o Interventions for Pain:  Pain management  o Intervention effective: yes  o Time of last intervention: 0500   o Reassessment Completed: yes      Last 3 Weights:  Last 3 Recorded Weights in this Encounter    02/12/18 1051   Weight: 73.9 kg (163 lb)     Weight change:      INTAKE/OUPUT    Current Shift:      Last three shifts: 02/12 1901 - 02/14 0700  In: 1162 [P.O.:240; I.V.:922]  Out: -      LAB RESULTS     Recent Labs      02/13/18 0420 02/12/18   1444   WBC  7.8  8.6   HGB  10.8*  11.4*   HCT  33.2*  34.9*   PLT  244  319        Recent Labs      02/13/18 0420 02/12/18   1620   NA  136  138   K  3.9  3.9   GLU  86  83   BUN  6*  5*   CREA  0.71  0.68   CA  8.2*  8.2*       RECOMMENDATIONS AND DISCHARGE PLANNING     1. Pending tests/procedures/ Plan of Care or Other Needs: OB consult, ABX, IV fluids, pain mgt. 2. Discharge plan for patient and Needs/Barriers: TBD    3. Estimated Discharge Date:  TBD Posted on Whiteboard in Patients Room: yes      4. The patient's care plan was reviewed with the oncoming nurse. \"HEALS\" SAFETY CHECK      Fall Risk    Total Score: 0    Safety Measures: Safety Measures: Bed/Chair-Wheels locked, Bed in low position, Call light within reach    A safety check occurred in the patient's room between off going nurse and oncoming nurse listed above.     The safety check included the below items  Area Items   H  High Alert Medications - Verify all high alert medication drips (heparin, PCA, etc.)   E  Equipment - Suction is set up for ALL patients (with yanker)  - Red plugs utilized for all equipment (IV pumps, etc.)  - WOWs wiped down at end of shift.  - Room stocked with oxygen, suction, and other unit-specific supplies   A  Alarms - Bed alarm is set for fall risk patients  - Ensure chair alarm is in place and activated if patient is up in a chair   L  Lines - Check IV for any infiltration  - Baker bag is empty if patient has a Baker   - Tubing and IV bags are labeled   S  Safety   - Room is clean, patient is clean, and equipment is clean. - Hallways are clear from equipment besides carts. - Fall bracelet on for fall risk patients  - Ensure room is clear and free of clutter  - Suction is set up for ALL patients (with yanker)  - Hallways are clear from equipment besides carts.    - Isolation precautions followed, supplies available outside room, sign posted     Susan Gutierrez RN

## 2018-02-14 NOTE — PROGRESS NOTES
Care Management Interventions  Mode of Transport at Discharge: Other (see comment) (friend or family)  Physical Therapy Consult: No  Occupational Therapy Consult: No  Current Support Network: Other Raymond Romero with daughter)  Confirm Follow Up Transport: Friends  Plan discussed with Pt/Family/Caregiver: Yes    Gave patient a list of PCP, and she chose Dr. Heather Tang with [facility]. She also chose for GYN  [address]. She lives with her daughter. This patient is fully ambulatory with out limitations. She uses the out patient pharmacy here at [facility]. She says she will be discharged this l Friday.

## 2018-02-14 NOTE — PROGRESS NOTES
Gynecology Progress Note      She is without significant complaints. Denies f/c, n/v. Pain controlled on current medication. Voiding without difficulty. Patient is passing flatus. She is is tolerating her diet.         Current Facility-Administered Medications   Medication Dose Route Frequency    clindamycin phosphate 900 mg in 0.9% sodium chloride (MBP/ADV) 100 mL ADV  900 mg IntraVENous Q8H    ampicillin (OMNIPEN) 2 g in 0.9% sodium chloride (MBP/ADV) 100 mL MBP  2 g IntraVENous Q6H    sodium chloride (NS) flush 5-10 mL  5-10 mL IntraVENous Q8H    gentamicin (GARAMYCIN) 331.2 mg in 0.9% sodium chloride 100 mL IVPB  5 mg/kg (Ideal) IntraVENous Q24H    0.9% sodium chloride infusion  100 mL/hr IntraVENous CONTINUOUS       Vitals:  Visit Vitals    /69 (BP 1 Location: Left arm, BP Patient Position: Head of bed elevated (Comment degrees))    Pulse 69    Temp 98.9 °F (37.2 °C)    Resp 18    Ht 5' 9\" (1.753 m)    Wt 163 lb (73.9 kg)    SpO2 98%    Breastfeeding No    BMI 24.07 kg/m2     Temp (24hrs), Av.1 °F (36.7 °C), Min:97.2 °F (36.2 °C), Max:98.9 °F (37.2 °C)      Last 24hr Input/Output:  No intake or output data in the 24 hours ending 18 1220       Exam:  General: alert, cooperative, no distress, appears stated age     Abdomen: abdomen is soft, ND, mild tenderness on the left with deep palpation, no rebound, no guarding, +BS     Extremities: extremities normal, atraumatic, no cyanosis or edema, no edema, redness or tenderness in the calves or thighs      Labs:   Lab Results   Component Value Date/Time    WBC 7.8 2018 04:20 AM    WBC 8.6 2018 02:44 PM    WBC 11.8 2017 12:00 PM    HGB 10.8 (L) 2018 04:20 AM    HGB 11.4 (L) 2018 02:44 PM    HGB 13.0 2017 12:00 PM    HCT 33.2 (L) 2018 04:20 AM    HCT 34.9 (L) 2018 02:44 PM    HCT 38.6 2017 12:00 PM    PLATELET 933  04:20 AM    PLATELET 440  02:44 PM    PLATELET 336 12/21/2017 12:00 PM       No results found for this or any previous visit (from the past 24 hour(s)). Assesment/Plan:   [id number] with h/o PID now admitted with evidence acute on chronic PID on exam s/p failed outpatient management. Afebrile x 24hrs, with no leukocytosis. Still requiring pain medications.     Continue Amp/Gent/Clinda  GC/CT/TV neg  Vaginal cultures per micro lab was never sent,   Wet prep showed bacterial vaginosis--> on IV clindamycin  Continue percocet for pain  Regular diet  Consider possible OR removal of psyosalpinx if symptoms do not improve      Aneesh Hardy MD  6/51/[id number]:05 PM

## 2018-02-14 NOTE — PROGRESS NOTES
Problem: Falls - Risk of  Goal: *Absence of Falls  Document Maria De Jesus Fall Risk and appropriate interventions in the flowsheet.    Outcome: Progressing Towards Goal  Fall Risk Interventions:            Medication Interventions: Teach patient to arise slowly, Assess postural VS orthostatic hypotension

## 2018-02-14 NOTE — PROGRESS NOTES
Bedside shift change report given to Bee RN (oncoming nurse) by Chanel Graff RN (offgoing nurse). Report included the following information SBAR.

## 2018-02-15 VITALS
SYSTOLIC BLOOD PRESSURE: 107 MMHG | OXYGEN SATURATION: 96 % | TEMPERATURE: 98.1 F | HEART RATE: 68 BPM | BODY MASS INDEX: 24.14 KG/M2 | HEIGHT: 69 IN | WEIGHT: 163 LBS | DIASTOLIC BLOOD PRESSURE: 62 MMHG | RESPIRATION RATE: 18 BRPM

## 2018-02-15 PROCEDURE — 74011250636 HC RX REV CODE- 250/636: Performed by: EMERGENCY MEDICINE

## 2018-02-15 PROCEDURE — 74011000258 HC RX REV CODE- 258: Performed by: EMERGENCY MEDICINE

## 2018-02-15 PROCEDURE — 74011000250 HC RX REV CODE- 250: Performed by: EMERGENCY MEDICINE

## 2018-02-15 PROCEDURE — 74011250636 HC RX REV CODE- 250/636: Performed by: OBSTETRICS & GYNECOLOGY

## 2018-02-15 RX ADMIN — CLINDAMYCIN PHOSPHATE 900 MG: 150 INJECTION, SOLUTION INTRAVENOUS at 00:13

## 2018-02-15 RX ADMIN — AMPICILLIN 2 G: 2 INJECTION, POWDER, FOR SOLUTION INTRAVENOUS at 02:09

## 2018-02-15 RX ADMIN — Medication 10 ML: at 05:27

## 2018-02-15 RX ADMIN — SODIUM CHLORIDE 100 ML/HR: 900 INJECTION, SOLUTION INTRAVENOUS at 05:21

## 2018-02-15 RX ADMIN — AMPICILLIN SODIUM 2 G: 2 INJECTION, POWDER, FOR SOLUTION INTRAVENOUS at 08:46

## 2018-02-15 RX ADMIN — CLINDAMYCIN PHOSPHATE 900 MG: 150 INJECTION, SOLUTION INTRAVENOUS at 07:35

## 2018-02-15 NOTE — DISCHARGE INSTRUCTIONS
Activity: No sex, douching, or tampons for 6 weeks or as directed by your physician. Diet: Resume pre-hospital diet  Call the office or return to the emergency department if fevers/chills or worsening abdominal pain.      Follow-up with [facility] in 2 weeks. Pelvic Inflammatory Disease: Care Instructions  Your Care Instructions    Pelvic inflammatory disease, or PID, is an infection of a woman's fallopian tubes and other reproductive organs. PID is usually caused by a sexually transmitted infection (STI), such as gonorrhea or chlamydia. PID can cause scars in the fallopian tubes, making it hard for a woman to get pregnant. Having one STI increases your risk for other STIs, such as genital herpes, genital warts, syphilis, and HIV. It is important to take all the medicine that was prescribed. PID can cause serious health problems if you do not complete your treatment. Follow-up care is a key part of your treatment and safety. Be sure to make and go to all appointments, and call your doctor if you are having problems. It's also a good idea to know your test results and keep a list of the medicines you take. How can you care for yourself at home? · Take your antibiotics as directed. Do not stop taking them just because you feel better. You need to take the full course of antibiotics. · Rest until your fever and pain have improved. · Take pain medicines exactly as directed. ¨ If the doctor gave you a prescription medicine for pain, take it as prescribed. ¨ If you are not taking a prescription pain medicine, ask your doctor if you can take an over-the-counter medicine. · Use a hot water bottle or a heating pad (set on low) on your belly for pain. · Do not douche. · Do not have sex or use tampons (you can use pads instead) until you have taken all the medicine, your pain is gone, and you feel completely well. · Talk to any sex partners you have had in the past 2 months.  They need to be tested and may need to be treated for STIs. To prevent STIs  · Use latex condoms every time you have sex. Use them from the beginning to the end of sexual contact. · Talk to your partner before you have sex. Find out if he or she has or is at risk for any sexually transmitted infection (STI). Keep in mind that a person may be able to spread an STI even if he or she does not have symptoms. · Do not have sex with anyone who has symptoms of an STI, such as sores on the genitals or mouth. · Having one sex partner (who does not have STIs and does not have sex with anyone else) is a good way to avoid STIs. When should you call for help? Call your doctor now or seek immediate medical care if:  ? · You have a new or higher fever. ? · You have unusual vaginal bleeding. ? · You have new or worse belly or pelvic pain. ? · You have vaginal discharge that has increased in amount or smells bad. ? Watch closely for changes in your health, and be sure to contact your doctor if:  ? · You do not get better as expected. Where can you learn more? Go to http://bella-keri.info/. Enter N294 in the search box to learn more about \"Pelvic Inflammatory Disease: Care Instructions. \"  Current as of: October 13, 2016  Content Version: 11.4  © 5102-8936 [employer]. Care instructions adapted under license by [facility] (which disclaims liability or warranty for this information). If you have questions about a medical condition or this instruction, always ask your healthcare professional. [place] [address] any warranty or liability for your use of this information.     Patient armband removed and shredded      DISCHARGE SUMMARY from Nurse    PATIENT INSTRUCTIONS:    After general anesthesia or intravenous sedation, for 24 hours or while taking prescription Narcotics:  · Limit your activities  · Do not drive and operate hazardous machinery  · Do not make important personal or business decisions  · Do  not drink alcoholic beverages  · If you have not urinated within 8 hours after discharge, please contact your surgeon on call. Report the following to your surgeon:  · Excessive pain, swelling, redness or odor of or around the surgical area  · Temperature over 100.5  · Nausea and vomiting lasting longer than 4 hours or if unable to take medications  · Any signs of decreased circulation or nerve impairment to extremity: change in color, persistent  numbness, tingling, coldness or increase pain  · Any questions    What to do at Home:  Recommended activity: Activity as tolerated within restrictions detailed above by provider    If you experience any of the following symptoms Nausea, vomiting, diarrhea, fever greater than 100.5, dizziness, severe headache, shortness of breath, chest pain, increased pain, please follow up with PCP. *  Please give a list of your current medications to your Primary Care Provider. *  Please update this list whenever your medications are discontinued, doses are      changed, or new medications (including over-the-counter products) are added. *  Please carry medication information at all times in case of emergency situations. These are general instructions for a healthy lifestyle:    No smoking/ No tobacco products/ Avoid exposure to second hand smoke  Surgeon General's Warning:  Quitting smoking now greatly reduces serious risk to your health. Obesity, smoking, and sedentary lifestyle greatly increases your risk for illness    A healthy diet, regular physical exercise & weight monitoring are important for maintaining a healthy lifestyle    You may be retaining fluid if you have a history of heart failure or if you experience any of the following symptoms:  Weight gain of 3 pounds or more overnight or 5 pounds in a week, increased swelling in our hands or feet or shortness of breath while lying flat in bed.   Please call your doctor as soon as you notice any of these symptoms; do not wait until your next office visit. Recognize signs and symptoms of STROKE:    F-face looks uneven    A-arms unable to move or move unevenly    S-speech slurred or non-existent    T-time-call 911 as soon as signs and symptoms begin-DO NOT go       Back to bed or wait to see if you get better-TIME IS BRAIN. Warning Signs of HEART ATTACK     Call 911 if you have these symptoms:   Chest discomfort. Most heart attacks involve discomfort in the center of the chest that lasts more than a few minutes, or that goes away and comes back. It can feel like uncomfortable pressure, squeezing, fullness, or pain.  Discomfort in other areas of the upper body. Symptoms can include pain or discomfort in one or both arms, the back, neck, jaw, or stomach.  Shortness of breath with or without chest discomfort.  Other signs may include breaking out in a cold sweat, nausea, or lightheadedness. Don't wait more than five minutes to call 911 - MINUTES MATTER! Fast action can save your life. Calling 911 is almost always the fastest way to get lifesaving treatment. Emergency Medical Services staff can begin treatment when they arrive -- up to an hour sooner than if someone gets to the hospital by car. The discharge information has been reviewed with the patient. The patient verbalized understanding. Discharge medications reviewed with the patient and appropriate educational materials and side effects teaching were provided.   ___________________________________________________________________________________________________________________________________

## 2018-02-15 NOTE — PROGRESS NOTES
When reviewing patient's discharge paperwork, patient states that her preferred pharmacy was [facility]. Discharge paperwork initially stated that her medications were sent to a [facility]. Updated preferred pharmacy and discharge nurse was also notified. Discharge nurse, Leana Forrestert to notify MD. While in room removing IV Patient on the phone with family to get her. Patient stated to family that she would be downstairs in 15 minutes. Informed patient that we still needed to go over her discharge paperwork and get her medications switched. Patient walked out of room at [phone] and left unit  without her discharge paperwork being signed in her street clothes. Patient IV removed previous to patient leaving unit. Charge nurse, Rajat Carmona RN notified, Nursing supervior notified.  Patient returned to unit [id number]

## 2018-02-15 NOTE — ROUTINE PROCESS
Bedside and Verbal shift change report given to SHAMAR Quach(oncoming nurse) by Wilma Graff RN (offgoing nurse). Report included the following information SBAR, Kardex, MAR and Recent Results. SITUATION:    Code Status: Full Code  Reason for Admission: PID (acute pelvic inflammatory disease)  TOA (tubo-ovarian abscess)   Abdominal pain    [facility] day: 3   Problem List:       Hospital Problems  Date Reviewed: 6/5/2017          Codes Class Noted POA    Abdominal pain ICD-10-CM: R10.9  ICD-9-CM: 789.00  2/12/2018 Unknown        PID (acute pelvic inflammatory disease) ICD-10-CM: N73.0  ICD-9-CM: 614.3  2/12/2018 Unknown              BACKGROUND:    Past Medical History:   Past Medical History:   Diagnosis Date    Adnexal mass 06/05/2017    U/S Result: There is a enlarged, tubular structure within the right adnexa, possibly a dilated fallopian tube. Complex hydrosalpinx or pyosalpinx could be considered. Tubo-ovarian abscess is not entirely excluded.                     Anxiety     BV (bacterial vaginosis)     Depression     Emotional instability     Normocytic anemia          Patient taking anticoagulants no     ASSESSMENT:    Changes in Assessment Throughout Shift: none     Patient has Central Line: no Reasons if yes: n/a   Patient has Baker Cath: no Reasons if yes: n/a      Last Vitals:     Vitals:    02/14/18 2100 02/15/18 0009 02/15/18 0355 02/15/18 0744   BP: 122/82 102/61  107/62   Pulse: 69 67  68   Resp: 18 18 17 18   Temp: 99.3 °F (37.4 °C) 98.5 °F (36.9 °C)  98.1 °F (36.7 °C)   SpO2: 100% 100%  96%   Weight:       Height:            IV and DRAINS (will only show if present)   Peripheral IV 02/14/18 Right Antecubital-Site Assessment: Clean, dry, & intact     WOUND (if present)   Wound Type:  none   Dressing present Dressing Present : No   Wound Concerns/Notes:  none     PAIN    Pain Assessment    Pain Intensity 1: 0 (02/15/18 0355)    Pain Location 1: Abdomen    Pain Intervention(s) 1: Medication (see MAR)    Patient Stated Pain Goal: 0  o Interventions for Pain:  Pain management  o Intervention effective: yes  o Time of last intervention: 2226  o Reassessment Completed: yes      Last 3 Weights:  Last 3 Recorded Weights in this Encounter    02/12/18 1051   Weight: 73.9 kg (163 lb)     Weight change:      INTAKE/OUPUT    Current Shift: 02/15 0701 - 02/15 1900  In: 120 [P.O.:120]  Out: -     Last three shifts: 02/13 1901 - 02/15 0700  In: 240 [P.O.:240]  Out: -      LAB RESULTS     Recent Labs      02/13/18 0420 02/12/18   1444   WBC  7.8  8.6   HGB  10.8*  11.4*   HCT  33.2*  34.9*   PLT  244  319        Recent Labs      02/13/18 0420 02/12/18   1620   NA  136  138   K  3.9  3.9   GLU  86  83   BUN  6*  5*   CREA  0.71  0.68   CA  8.2*  8.2*       RECOMMENDATIONS AND DISCHARGE PLANNING     1. Pending tests/procedures/ Plan of Care or Other Needs: OB consult, ABX, IV fluids, pain mgt. 2. Discharge plan for patient and Needs/Barriers: TBD    3. Estimated Discharge Date:  TBD Posted on Whiteboard in Patients Room: yes      4. The patient's care plan was reviewed with the oncoming nurse. \"HEALS\" SAFETY CHECK      Fall Risk    Total Score: 1    Safety Measures: Safety Measures: Bed/Chair alarm on, Bed/Chair-Wheels locked, Bed in low position, Fall prevention (comment), Gripper socks, Side rails X 3    A safety check occurred in the patient's room between off going nurse and oncoming nurse listed above.     The safety check included the below items  Area Items   H  High Alert Medications - Verify all high alert medication drips (heparin, PCA, etc.)   E  Equipment - Suction is set up for ALL patients (with yanker)  - Red plugs utilized for all equipment (IV pumps, etc.)  - WOWs wiped down at end of shift.  - Room stocked with oxygen, suction, and other unit-specific supplies   A  Alarms - Bed alarm is set for fall risk patients  - Ensure chair alarm is in place and activated if patient is up in a chair   L  Lines - Check IV for any infiltration  - Baker bag is empty if patient has a Baker   - Tubing and IV bags are labeled   S  Safety   - Room is clean, patient is clean, and equipment is clean. - Hallways are clear from equipment besides carts. - Fall bracelet on for fall risk patients  - Ensure room is clear and free of clutter  - Suction is set up for ALL patients (with yanker)  - Hallways are clear from equipment besides carts.    - Isolation precautions followed, supplies available outside room, sign posted     Sailaja Choudhary RN

## 2018-02-15 NOTE — DISCHARGE SUMMARY
Gynecology Surgical Discharge Summary     Name: Larry Peter MRN: [id number]  SSN: [id number]    YOB: 1985  Age: 29 y.o. Sex: female      Admit date: 2/12/2018    Discharge Date: 2/14/2018      Attending Physician: Aneesh Liriano MD     Admission Diagnoses: Acute on chronic PID    Discharge Diagnoses: Active Problems:    Abdominal pain (2/12/2018)      PID (acute pelvic inflammatory disease) (2/12/2018)        Hospital Course: Pt was admitted and started on IV antibiotics. She remained afebrile throughout her hospital stay and her pain improved. She was discharged on HD4 tolerating a regular diet, ambulating, and pain controlled. Rx for Doxycycline and Flagyl for 14 days was sent to her pharmacy. She was to follow up with [facility] in 2weeks. Significant Diagnostic Studies:  Pelvic and transvaginal ultrasound     HISTORY: Generalized pelvic pain intermittently for several months. History of  hydrosalpinx     COMPARISON: 9/11/17     FINDINGS: Transabdominal pelvic ultrasound was initially performed. The uterus  appears normal in contour and size, measuring 8.8 x 4.9 x 4.6 cm. There is no  interuterine mass identified to suggest fibroids. The endometrial stripe and  ovaries are better visualized by transvaginal approach.     Transvaginal ultrasound was then followed. Again, no focal myometrial  abnormality seen. The endometrial stripe appears uniform and normal, measuring  7.8 mm.     Both ovaries are visualized. The right ovary measures 2.8 x 2.5 x 3.1 cm. The  left ovary measures 3.2 x 2.5 x 3.5 cm. Color flow and spectral doppler  analysis were also performed to both ovaries. There is normal flow identified  in both ovaries. The fluid-filled large tubular lesion is again seen in right  adnexa with no significant interval change compared to the prior ultrasound and  roughly measures 2.4 cm in diameter and 5.6 cm in length. Thickened wall of the  lesion measures 4 mm. Small surrounding fluid present. Within the left ovary,  small complex area noted and measures 1.5 x 2.0 cm, similar to the prior study  as more likely partially collapsed follicle. Small free fluid identified in the  cul-de-sac.     IMPRESSION  IMPRESSION:     1. Large fluid-filled tubular lesion with thickened wall and surrounding free  fluid again noted in right adnexa, compatible with chronic  pyosalpinx. No  significant interval change noted compared to the prior ultrasound in 9/17.     2. Stable small left ovarian cyst.     3. Small free pelvic fluid. Patient Instructions:   Current Discharge Medication List      START taking these medications    Details   ibuprofen (MOTRIN) 600 mg tablet Take 1 Tab by mouth every six (6) hours as needed for Pain. Indications: pelvic pain  Qty: 30 Tab, Refills: 1      acetaminophen (TYLENOL EXTRA STRENGTH) 500 mg tablet Take 1 Tab by mouth every six (6) hours as needed for Pain. Indications: Pain, pelvic pain  Qty: 30 Tab, Refills: 0      doxycycline (MONODOX) 100 mg capsule Take 1 Cap by mouth two (2) times a day for 14 days. Indications: PID  Qty: 28 Cap, Refills: 0      metroNIDAZOLE (FLAGYL) 500 mg tablet Take 1 Tab by mouth every twelve (12) hours for 14 days. Indications: PID  Qty: 28 Tab, Refills: 0         CONTINUE these medications which have NOT CHANGED    Details   ondansetron (ZOFRAN ODT) 8 mg disintegrating tablet Take 1 Tab by mouth every eight (8) hours as needed for Nausea. Qty: 15 Tab, Refills: 0    Associated Diagnoses: Pyosalpinx      sertraline (ZOLOFT) 100 mg tablet Take  by mouth daily. hydrOXYzine (VISTARIL) 50 mg capsule Take 100 mg by mouth three (3) times daily as needed for Itching. traZODone (DESYREL) 100 mg tablet Take 100 mg by mouth nightly.          STOP taking these medications       HYDROcodone-acetaminophen (NORCO) 5-325 mg per tablet Comments:   Reason for Stopping:              Activity: No sex, douching, or tampons for 6 weeks or as directed by your physician. Diet: Resume pre-hospital diet  Call the office or return to the emergency department if fevers/chills or worsening abdominal pain. Follow-up with [facility] in 2 weeks.         Signed By:  Evette Perera MD     February 14, 2018 10:00 PM

## 2018-02-15 NOTE — ROUTINE PROCESS
Bedside shift change report given to Charles Pettit (oncoming nurse) by Wenceslao Viveros RN (offgoing nurse). Report included the following information SBAR. Patient standing at bedside at the time of report. Informed  patient of antibiotics due at 0800 and active discharge orders and goal to get patient released as soon as possible. Patient states that she would like to go outside to smoke. Inform patient of non-smoking facility policy and that she cannot go outside with IV inserted and that we would get her out of the door as soon as possible.

## 2018-02-15 NOTE — PROGRESS NOTES
Discharge instructions gone over with patient to include follow-up appointment, medications, and medication education. Patient verbalized understanding of instructions and stated she had no questions. Patient's armband and IV had previously been removed. Patient was informed to call for a ride home and stated that ride would be here within approx. 10mins. Patient refused wheelchair and was advised to notify staff when ride arrives to be escorted to main entrance.

## 2018-02-15 NOTE — PROGRESS NOTES
Gynecology Progress Note        She is without significant complaints. Current Facility-Administered Medications   Medication Dose Route Frequency    clindamycin phosphate 900 mg in 0.9% sodium chloride (MBP/ADV) 100 mL ADV  900 mg IntraVENous Q8H    ampicillin (OMNIPEN) 2 g in 0.9% sodium chloride (MBP/ADV) 100 mL MBP  2 g IntraVENous Q6H    sodium chloride (NS) flush 5-10 mL  5-10 mL IntraVENous Q8H    gentamicin (GARAMYCIN) 331.2 mg in 0.9% sodium chloride 100 mL IVPB  5 mg/kg (Ideal) IntraVENous Q24H    0.9% sodium chloride infusion  100 mL/hr IntraVENous CONTINUOUS       Vitals:  Visit Vitals    /67 (BP 1 Location: Left arm, BP Patient Position: Head of bed elevated (Comment degrees))    Pulse (!) 57    Temp 97.5 °F (36.4 °C)    Resp 18    Ht 5' 9\" (1.753 m)    Wt 163 lb (73.9 kg)    SpO2 100%    Breastfeeding No    BMI 24.07 kg/m2     Temp (24hrs), Av.3 °F (36.8 °C), Min:97.5 °F (36.4 °C), Max:98.9 °F (37.2 °C)      Last 24hr Input/Output:  No intake or output data in the 24 hours ending 18       Exam:  General: alert, cooperative, no distress, appears stated age, comfortably watching TV     Abdomen: soft, NTND, +BS     Extremities: extremities normal, atraumatic, no cyanosis or edema      Labs:   Lab Results   Component Value Date/Time    WBC 7.8 2018 04:20 AM    WBC 8.6 2018 02:44 PM    WBC 11.8 2017 12:00 PM    HGB 10.8 (L) 2018 04:20 AM    HGB 11.4 (L) 2018 02:44 PM    HGB 13.0 2017 12:00 PM    HCT 33.2 (L) 2018 04:20 AM    HCT 34.9 (L) 2018 02:44 PM    HCT 38.6 2017 12:00 PM    PLATELET 586  04:20 AM    PLATELET 262  02:44 PM    PLATELET 017  12:00 PM       No results found for this or any previous visit (from the past 24 hour(s)). Assesment/Plan: 30P1 HD3 admitted for acute on chronic PID, on IV antibiotics recovering well. Now Afebrile x 48hrs.  No leukocytosis and pain improving.  VSS    Will d/c home tomorrow on doxycycline and flagyl [id number]  Pt to follow up at [facility] in 2weeks  Motrin and Tylenol prn for pain  Discussed POC with patient, understands and agrees with the plan, answered all of her questions,         Cristin Rocha MD  [id number]:79 PM

## 2018-02-17 ENCOUNTER — HOSPITAL ENCOUNTER (EMERGENCY)
Age: 33
Discharge: HOME | End: 2018-02-18
Attending: EMERGENCY MEDICINE | Admitting: EMERGENCY MEDICINE
Payer: MEDICAID

## 2018-02-17 ENCOUNTER — APPOINTMENT (OUTPATIENT)
Dept: ULTRASOUND IMAGING | Age: 33
End: 2018-02-17
Attending: PHYSICIAN ASSISTANT
Payer: MEDICAID

## 2018-02-17 DIAGNOSIS — N93.9 VAGINAL BLEEDING: Primary | ICD-10-CM

## 2018-02-17 LAB
ANION GAP SERPL CALC-SCNC: 10 MMOL/L (ref 3–18)
APPEARANCE UR: CLEAR
BACTERIA URNS QL MICRO: ABNORMAL /HPF
BASOPHILS # BLD: 0 K/UL (ref 0–0.1)
BASOPHILS NFR BLD: 0 % (ref 0–2)
BILIRUB UR QL: NEGATIVE
BUN SERPL-MCNC: 9 MG/DL (ref 7–18)
BUN/CREAT SERPL: 11 (ref 12–20)
CALCIUM SERPL-MCNC: 9.3 MG/DL (ref 8.5–10.1)
CHLORIDE SERPL-SCNC: 103 MMOL/L (ref 100–108)
CO2 SERPL-SCNC: 27 MMOL/L (ref 21–32)
COLOR UR: YELLOW
CREAT SERPL-MCNC: 0.84 MG/DL (ref 0.6–1.3)
DIFFERENTIAL METHOD BLD: ABNORMAL
EOSINOPHIL # BLD: 0.1 K/UL (ref 0–0.4)
EOSINOPHIL NFR BLD: 1 % (ref 0–5)
EPITH CASTS URNS QL MICRO: ABNORMAL /LPF (ref 0–5)
ERYTHROCYTE [DISTWIDTH] IN BLOOD BY AUTOMATED COUNT: 14.9 % (ref 11.6–14.5)
GLUCOSE SERPL-MCNC: 83 MG/DL (ref 74–99)
GLUCOSE UR STRIP.AUTO-MCNC: NEGATIVE MG/DL
HCG UR QL: NEGATIVE
HCT VFR BLD AUTO: 38 % (ref 35–45)
HGB BLD-MCNC: 12.8 G/DL (ref 12–16)
HGB UR QL STRIP: ABNORMAL
KETONES UR QL STRIP.AUTO: NEGATIVE MG/DL
LEUKOCYTE ESTERASE UR QL STRIP.AUTO: ABNORMAL
LYMPHOCYTES # BLD: 3.7 K/UL (ref 0.9–3.6)
LYMPHOCYTES NFR BLD: 53 % (ref 21–52)
MCH RBC QN AUTO: 29.4 PG (ref 24–34)
MCHC RBC AUTO-ENTMCNC: 33.7 G/DL (ref 31–37)
MCV RBC AUTO: 87.2 FL (ref 74–97)
MONOCYTES # BLD: 0.7 K/UL (ref 0.05–1.2)
MONOCYTES NFR BLD: 10 % (ref 3–10)
MUCOUS THREADS URNS QL MICRO: ABNORMAL /LPF
NEUTS SEG # BLD: 2.5 K/UL (ref 1.8–8)
NEUTS SEG NFR BLD: 36 % (ref 40–73)
NITRITE UR QL STRIP.AUTO: NEGATIVE
PH UR STRIP: 5 [PH] (ref 5–8)
PLATELET # BLD AUTO: 440 K/UL (ref 135–420)
PMV BLD AUTO: 10.6 FL (ref 9.2–11.8)
POTASSIUM SERPL-SCNC: 4.8 MMOL/L (ref 3.5–5.5)
PROT UR STRIP-MCNC: NEGATIVE MG/DL
RBC # BLD AUTO: 4.36 M/UL (ref 4.2–5.3)
RBC #/AREA URNS HPF: ABNORMAL /HPF (ref 0–5)
SODIUM SERPL-SCNC: 140 MMOL/L (ref 136–145)
SP GR UR REFRACTOMETRY: 1.02 (ref 1–1.03)
UROBILINOGEN UR QL STRIP.AUTO: 0.2 EU/DL (ref 0.2–1)
WBC # BLD AUTO: 7 K/UL (ref 4.6–13.2)
WBC URNS QL MICRO: ABNORMAL /HPF (ref 0–4)

## 2018-02-17 PROCEDURE — 85025 COMPLETE CBC W/AUTO DIFF WBC: CPT | Performed by: PHYSICIAN ASSISTANT

## 2018-02-17 PROCEDURE — 99283 EMERGENCY DEPT VISIT LOW MDM: CPT

## 2018-02-17 PROCEDURE — 76830 TRANSVAGINAL US NON-OB: CPT

## 2018-02-17 PROCEDURE — 81001 URINALYSIS AUTO W/SCOPE: CPT | Performed by: PHYSICIAN ASSISTANT

## 2018-02-17 PROCEDURE — 80048 BASIC METABOLIC PNL TOTAL CA: CPT | Performed by: PHYSICIAN ASSISTANT

## 2018-02-17 PROCEDURE — 81025 URINE PREGNANCY TEST: CPT | Performed by: PHYSICIAN ASSISTANT

## 2018-02-18 VITALS
TEMPERATURE: 98.3 F | RESPIRATION RATE: 18 BRPM | HEART RATE: 98 BPM | DIASTOLIC BLOOD PRESSURE: 73 MMHG | SYSTOLIC BLOOD PRESSURE: 111 MMHG | OXYGEN SATURATION: 97 %

## 2018-02-18 NOTE — ED PROVIDER NOTES
EMERGENCY DEPARTMENT HISTORY AND PHYSICAL EXAM    10:08 PM      Date: 2/17/2018  Patient Name: Mark Figueroa    History of Presenting Illness     Chief Complaint   Patient presents with    Vaginal Bleeding         History Provided By: Patient    Chief Complaint: Vaginal bleeding   Duration:  Hours  Timing:  Constant  Location: Genital   Quality: \"used 3 pads today\"  Modifying Factors: No alleviating or exacerbating factors reported  Associated Symptoms: Vomiting and diarrhea       Additional History (Context):     Mark Figueroa is a 29 y.o. female with a pertinent history of PID (Dx'ed 5 days ago, started taking the Rx'ed Doxycycline and Flagyl) presents to the ED c/o constant, vaginal bleeding starting today. Notes she used 3 pads so far today. Pt explains she was admitted at [facility] 5 days ago, was discharged 3 days ago, and was advised to present to the ED if she began to have vaginal bleeding. States her LMP ended 1 week ago. Also reports vomiting, diarrhea. Pt denies fever. No other acute symptoms or complaints were noted. PCP: Kalyn Schneider MD    Current Outpatient Prescriptions   Medication Sig Dispense Refill    ibuprofen (MOTRIN) 600 mg tablet Take 1 Tab by mouth every six (6) hours as needed for Pain. Indications: pelvic pain 30 Tab 1    acetaminophen (TYLENOL EXTRA STRENGTH) 500 mg tablet Take 1 Tab by mouth every six (6) hours as needed for Pain. Indications: Pain, pelvic pain 30 Tab 0    doxycycline (MONODOX) 100 mg capsule Take 1 Cap by mouth two (2) times a day for 14 days. Indications: PID 28 Cap 0    metroNIDAZOLE (FLAGYL) 500 mg tablet Take 1 Tab by mouth every twelve (12) hours for 14 days. Indications: PID 28 Tab 0    ondansetron (ZOFRAN ODT) 8 mg disintegrating tablet Take 1 Tab by mouth every eight (8) hours as needed for Nausea. 15 Tab 0    sertraline (ZOLOFT) 100 mg tablet Take  by mouth daily.       hydrOXYzine (VISTARIL) 50 mg capsule Take 100 mg by mouth three (3) times daily as needed for Itching.  traZODone (DESYREL) 100 mg tablet Take 100 mg by mouth nightly. Past History     Past Medical History:  Past Medical History:   Diagnosis Date    Adnexal mass 06/05/2017    U/S Result: There is a enlarged, tubular structure within the right adnexa, possibly a dilated fallopian tube. Complex hydrosalpinx or pyosalpinx could be considered. Tubo-ovarian abscess is not entirely excluded.  Anxiety     BV (bacterial vaginosis)     Depression     Emotional instability     Normocytic anemia        Past Surgical History:  No past surgical history on file. Family History:  Family History   Problem Relation Age of Onset    Hypertension Maternal Grandmother     Diabetes Paternal Grandmother     Diabetes Maternal Aunt     Diabetes Paternal Uncle     Hypertension Maternal Uncle        Social History:  Social History   Substance Use Topics    Smoking status: Current Every Day Smoker     Packs/day: 1.50     Types: Cigarettes    Smokeless tobacco: Never Used    Alcohol use No       Allergies: Allergies   Allergen Reactions    Shellfish Derived Anaphylaxis         Review of Systems       Review of Systems   Constitutional: Negative. Negative for chills, diaphoresis and fever. HENT: Negative. Negative for congestion, rhinorrhea and sore throat. Eyes: Negative. Negative for pain, discharge and redness. Respiratory: Negative. Negative for cough, chest tightness, shortness of breath and wheezing. Cardiovascular: Negative. Negative for chest pain. Gastrointestinal: Positive for diarrhea and vomiting. Negative for abdominal pain, constipation and nausea. Genitourinary: Positive for vaginal bleeding. Negative for dysuria, flank pain, frequency, hematuria and urgency. Musculoskeletal: Negative. Negative for back pain and neck pain. Skin: Negative. Negative for rash. Neurological: Negative.   Negative for syncope, weakness, numbness and headaches. Psychiatric/Behavioral: Negative. All other systems reviewed and are negative. Physical Exam     Visit Vitals    /68    Pulse 92    Temp 98.3 °F (36.8 °C)    Resp 16    SpO2 99%         Physical Exam   Constitutional: She appears well-developed and well-nourished. Non-toxic appearance. She does not have a sickly appearance. She does not appear ill. No distress. HENT:   Head: Normocephalic and atraumatic. Mouth/Throat: Oropharynx is clear and moist. No oropharyngeal exudate. Eyes: Conjunctivae and EOM are normal. Pupils are equal, round, and reactive to light. No scleral icterus. Neck: Normal range of motion. Neck supple. No hepatojugular reflux and no JVD present. No tracheal deviation present. No thyromegaly present. Cardiovascular: Normal rate, regular rhythm, S1 normal, S2 normal, normal heart sounds, intact distal pulses and normal pulses. Exam reveals no gallop, no S3 and no S4. No murmur heard. Pulses:       Radial pulses are 2+ on the right side, and 2+ on the left side. Dorsalis pedis pulses are 2+ on the right side, and 2+ on the left side. Pulmonary/Chest: Effort normal and breath sounds normal. No respiratory distress. She has no decreased breath sounds. She has no wheezes. She has no rhonchi. She has no rales. Abdominal: Soft. Normal appearance and bowel sounds are normal. She exhibits no distension and no mass. There is no hepatosplenomegaly. There is no tenderness. There is no rigidity, no rebound, no guarding, no CVA tenderness, no tenderness at McBurney's point and negative Denny's sign. Musculoskeletal: Normal range of motion. Lymphadenopathy:        Head (right side): No submental, no submandibular, no preauricular and no occipital adenopathy present. Head (left side): No submental, no submandibular, no preauricular and no occipital adenopathy present. She has no cervical adenopathy.         Right: No supraclavicular adenopathy present. Left: No supraclavicular adenopathy present. Neurological: She is alert. She has normal strength and normal reflexes. She is not disoriented. No cranial nerve deficit or sensory deficit. Coordination and gait normal. GCS eye subscore is 4. GCS verbal subscore is 5. GCS motor subscore is 6. Skin: Skin is warm, dry and intact. No rash noted. She is not diaphoretic. Psychiatric: She has a normal mood and affect. Her speech is normal and behavior is normal. Judgment and thought content normal. Cognition and memory are normal.   Nursing note and vitals reviewed.         Diagnostic Study Results     Labs -  Recent Results (from the past 12 hour(s))   URINALYSIS W/ RFLX MICROSCOPIC    Collection Time: 02/17/18  8:40 PM   Result Value Ref Range    Color YELLOW      Appearance CLEAR      Specific gravity 1.021 1.005 - 1.030      pH (UA) 5.0 5.0 - 8.0      Protein NEGATIVE  NEG mg/dL    Glucose NEGATIVE  NEG mg/dL    Ketone NEGATIVE  NEG mg/dL    Bilirubin NEGATIVE  NEG      Blood MODERATE (A) NEG      Urobilinogen 0.2 0.2 - 1.0 EU/dL    Nitrites NEGATIVE  NEG      Leukocyte Esterase SMALL (A) NEG     HCG URINE, QL    Collection Time: 02/17/18  8:40 PM   Result Value Ref Range    HCG urine, QL NEGATIVE  NEG     URINE MICROSCOPIC ONLY    Collection Time: 02/17/18  8:40 PM   Result Value Ref Range    WBC 3 to 5 0 - 4 /hpf    RBC 0 to 1 0 - 5 /hpf    Epithelial cells FEW 0 - 5 /lpf    Bacteria 2+ (A) NEG /hpf    Mucus 2+ (A) NEG /lpf   CBC WITH AUTOMATED DIFF    Collection Time: 02/17/18  9:20 PM   Result Value Ref Range    WBC 7.0 4.6 - 13.2 K/uL    RBC 4.36 4.20 - 5.30 M/uL    HGB 12.8 12.0 - 16.0 g/dL    HCT 38.0 35.0 - 45.0 %    MCV 87.2 74.0 - 97.0 FL    MCH 29.4 24.0 - 34.0 PG    MCHC 33.7 31.0 - 37.0 g/dL    RDW 14.9 (H) 11.6 - 14.5 %    PLATELET 954 (H) 112 - 420 K/uL    MPV 10.6 9.2 - 11.8 FL    NEUTROPHILS 36 (L) 40 - 73 %    LYMPHOCYTES 53 (H) 21 - 52 %    MONOCYTES 10 3 - 10 % EOSINOPHILS 1 0 - 5 %    BASOPHILS 0 0 - 2 %    ABS. NEUTROPHILS 2.5 1.8 - 8.0 K/UL    ABS. LYMPHOCYTES 3.7 (H) 0.9 - 3.6 K/UL    ABS. MONOCYTES 0.7 0.05 - 1.2 K/UL    ABS. EOSINOPHILS 0.1 0.0 - 0.4 K/UL    ABS. BASOPHILS 0.0 0.0 - 0.1 K/UL    DF AUTOMATED     METABOLIC PANEL, BASIC    Collection Time: 02/17/18  9:20 PM   Result Value Ref Range    Sodium 140 136 - 145 mmol/L    Potassium 4.8 3.5 - 5.5 mmol/L    Chloride 103 100 - 108 mmol/L    CO2 27 21 - 32 mmol/L    Anion gap 10 3.0 - 18 mmol/L    Glucose 83 74 - 99 mg/dL    BUN 9 7.0 - 18 MG/DL    Creatinine 0.84 0.6 - 1.3 MG/DL    BUN/Creatinine ratio 11 (L) 12 - 20      GFR est AA >60 >60 ml/min/1.73m2    GFR est non-AA >60 >60 ml/min/1.73m2    Calcium 9.3 8.5 - 10.1 MG/DL       Radiologic Studies -   US TRANSVAGINAL   Final Result            Medical Decision Making   I am the first provider for this patient. I reviewed the vital signs, available nursing notes, past medical history, past surgical history, family history and social history. Vital Signs-Reviewed the patient's vital signs. Records Reviewed: Nursing Notes (Time of Review: 10:08 PM)    ED Course: Progress Notes, Reevaluation, and Consults:    Labs essentially normal. UA negative. Pregnancy negative. US TRANSVAGINAL showed IMPRESSION: 1. Complicated left adnexal cyst. Consider follow-up imaging in 6-12 weeks. 2.  Dilated fallopian tube on the right which is less conspicuous than prior imaging. 3.  Unremarkable examination of uterus. 10:08 PM 2/17/2018      Provider Notes (Medical Decision Making):  MDM  Number of Diagnoses or Management Options  Vaginal bleeding:   Diagnosis management comments: Vaginal bleeding secondary to ectopic pregnancy, bleeding with pregnancy, threatened miscarriage, dysfunctional uterine bleeding, vaginal laceration, ovarian cyst rupture, severe pelvic inflammatory disease, malignancy, other etiologies or combination of the above.         Diagnosis       I have reassessed the patient. Patient is feeling well. Patient was discharged in stable condition. Patient is to return to emergency department if any new or worsening condition. Clinical Impression:   1. Vaginal bleeding        Disposition: Discharged     Follow-up Information     Follow up With Details Comments Contact Abrahan Lee MD Call in 2 days for PCP follow-up [address] [place] [address]  [phone]      [facility] EMERGENCY DEPT Go to As needed, If symptoms worsen [address] [place] [id number]  [phone]           _______________________________    Attestations:  Scribe Attestation     Norma Hill acting as a scribe for and in the presence of Zaida Ricci DO      February 17, 2018 at 10:08 PM       Provider Attestation:      I personally performed the services described in the documentation, reviewed the documentation, as recorded by the scribe in my presence, and it accurately and completely records my words and actions.  February 17, 2018 at 10:08 PM - Zaida Ricci DO    _______________________________

## 2018-02-18 NOTE — ED NOTES
I performed a brief evaluation, including history and physical, of the patient here in triage and I have determined that pt will need further treatment and evaluation from the main side ER physician. I have placed initial orders to help in expediting patients care.      February 17, 2018 at 8:26 PM - Zohaib Walker, [id number]

## 2018-02-18 NOTE — DISCHARGE INSTRUCTIONS
Vaginal Bleeding in Nonpregnant Women: Care Instructions  Your Care Instructions    Many women have bleeding or spotting between periods. Lots of things can cause it. You may bleed because of hormone problems, stress, or ovulation. Fibroids and IUDs (intrauterine devices) can also cause bleeding. If your bleeding or spotting is caused by one of these things and is not heavy or doesn't happen often, you probably don't need to worry. But in rare cases, infection, cancer, or other serious conditions can cause bleeding. So you may need more tests to find the cause of your bleeding. The doctor has checked you carefully, but problems can develop later. If you notice any problems or new symptoms, get medical treatment right away. Follow-up care is a key part of your treatment and safety. Be sure to make and go to all appointments, and call your doctor if you are having problems. It's also a good idea to know your test results and keep a list of the medicines you take. How can you care for yourself at home? · Take pain medicines exactly as directed. ¨ If the doctor gave you a prescription medicine for pain, take it as prescribed. ¨ If you are not taking a prescription pain medicine, ask your doctor if you can take an over-the-counter medicine. Do not take aspirin, which may make bleeding worse. · If your doctor prescribed birth control pills for your bleeding, take them as directed. · Eat foods that are high in iron and vitamin C. Foods high in iron include red meat, shellfish, eggs, beans, and leafy green vegetables. Foods high in vitamin C include citrus fruits, tomatoes, and broccoli. Ask your doctor if you need to take iron pills or a multivitamin. · Ask your doctor when it is okay to have sex. When should you call for help? Call 911 anytime you think you may need emergency care. For example, call if:  ? · You passed out (lost consciousness).    ?Call your doctor now or seek immediate medical care if:  ? · You have severe vaginal bleeding. ? · You are dizzy or lightheaded, or you feel like you may faint. ? · You have new or worse belly or pelvic pain. ? Watch closely for changes in your health, and be sure to contact your doctor if:  ? · Your bleeding gets worse. ? · You think you might be pregnant. ? · You do not get better as expected. Where can you learn more? Go to http://bella-keri.info/. Enter A585 in the search box to learn more about \"Vaginal Bleeding in Nonpregnant Women: Care Instructions. \"  Current as of: October 13, 2016  Content Version: 11.4  © 4268-1077 [employer]. Care instructions adapted under license by [employer] (which disclaims liability or warranty for this information). If you have questions about a medical condition or this instruction, always ask your healthcare professional. [address] any warranty or liability for your use of this information.

## 2018-02-18 NOTE — ED NOTES
I have reviewed discharge instructions with the patient. The patient verbalized understanding. Patient armband removed and given to patient to take home. Patient was informed of the privacy risks if armband lost or stolen. 20 G IV in L arm removed, cath intact.

## 2018-02-18 NOTE — ED TRIAGE NOTES
Patient was just released from the hospital from having infection in her tubes , patient was put on antibiotics and started taking them this morning. Patient had vaginal bleeding. Patient came off her menstrual on the 9th of this month.

## 2019-08-12 ENCOUNTER — HOSPITAL ENCOUNTER (EMERGENCY)
Age: 34
Discharge: HOME | End: 2019-08-12
Attending: EMERGENCY MEDICINE
Payer: SELF-PAY

## 2019-08-12 ENCOUNTER — HOSPITAL ENCOUNTER (EMERGENCY)
Dept: ULTRASOUND IMAGING | Age: 34
Discharge: HOME | End: 2019-08-12
Attending: EMERGENCY MEDICINE
Payer: SELF-PAY

## 2019-08-12 VITALS
DIASTOLIC BLOOD PRESSURE: 76 MMHG | HEART RATE: 85 BPM | SYSTOLIC BLOOD PRESSURE: 119 MMHG | OXYGEN SATURATION: 100 % | TEMPERATURE: 95.5 F | RESPIRATION RATE: 18 BRPM

## 2019-08-12 DIAGNOSIS — R11.2 INTRACTABLE VOMITING WITH NAUSEA, UNSPECIFIED VOMITING TYPE: ICD-10-CM

## 2019-08-12 DIAGNOSIS — R10.30 LOWER ABDOMINAL PAIN: ICD-10-CM

## 2019-08-12 DIAGNOSIS — N94.6 DYSMENORRHEA: Primary | ICD-10-CM

## 2019-08-12 LAB
ABO + RH BLD: NORMAL
ANION GAP SERPL CALC-SCNC: 7 MMOL/L (ref 3–18)
APPEARANCE UR: CLEAR
BACTERIA URNS QL MICRO: ABNORMAL /HPF
BASOPHILS # BLD: 0 K/UL (ref 0–0.1)
BASOPHILS NFR BLD: 0 % (ref 0–2)
BILIRUB UR QL: NEGATIVE
BLOOD GROUP ANTIBODIES SERPL: NORMAL
BUN SERPL-MCNC: 8 MG/DL (ref 7–18)
BUN/CREAT SERPL: 8 (ref 12–20)
CALCIUM SERPL-MCNC: 9.2 MG/DL (ref 8.5–10.1)
CHLORIDE SERPL-SCNC: 107 MMOL/L (ref 100–111)
CO2 SERPL-SCNC: 28 MMOL/L (ref 21–32)
COLOR UR: ABNORMAL
CREAT SERPL-MCNC: 0.99 MG/DL (ref 0.6–1.3)
DIFFERENTIAL METHOD BLD: NORMAL
EOSINOPHIL # BLD: 0 K/UL (ref 0–0.4)
EOSINOPHIL NFR BLD: 0 % (ref 0–5)
EPITH CASTS URNS QL MICRO: ABNORMAL /LPF (ref 0–5)
ERYTHROCYTE [DISTWIDTH] IN BLOOD BY AUTOMATED COUNT: 13.7 % (ref 11.6–14.5)
GLUCOSE SERPL-MCNC: 125 MG/DL (ref 74–99)
GLUCOSE UR STRIP.AUTO-MCNC: NEGATIVE MG/DL
HCG UR QL: NEGATIVE
HCT VFR BLD AUTO: 38.2 % (ref 35–45)
HGB BLD-MCNC: 12.7 G/DL (ref 12–16)
HGB UR QL STRIP: ABNORMAL
KETONES UR QL STRIP.AUTO: ABNORMAL MG/DL
LEUKOCYTE ESTERASE UR QL STRIP.AUTO: NEGATIVE
LYMPHOCYTES # BLD: 1.8 K/UL (ref 0.9–3.6)
LYMPHOCYTES NFR BLD: 28 % (ref 21–52)
MCH RBC QN AUTO: 30.1 PG (ref 24–34)
MCHC RBC AUTO-ENTMCNC: 33.2 G/DL (ref 31–37)
MCV RBC AUTO: 90.5 FL (ref 74–97)
MONOCYTES # BLD: 0.5 K/UL (ref 0.05–1.2)
MONOCYTES NFR BLD: 7 % (ref 3–10)
MUCOUS THREADS URNS QL MICRO: ABNORMAL /LPF
NEUTS SEG # BLD: 4.1 K/UL (ref 1.8–8)
NEUTS SEG NFR BLD: 65 % (ref 40–73)
NITRITE UR QL STRIP.AUTO: NEGATIVE
PH UR STRIP: 6 [PH] (ref 5–8)
PLATELET # BLD AUTO: 293 K/UL (ref 135–420)
PMV BLD AUTO: 10.8 FL (ref 9.2–11.8)
POTASSIUM SERPL-SCNC: 4 MMOL/L (ref 3.5–5.5)
PROT UR STRIP-MCNC: 30 MG/DL
RBC # BLD AUTO: 4.22 M/UL (ref 4.2–5.3)
RBC #/AREA URNS HPF: ABNORMAL /HPF (ref 0–5)
SODIUM SERPL-SCNC: 142 MMOL/L (ref 136–145)
SP GR UR REFRACTOMETRY: >1.03 (ref 1–1.03)
SPECIMEN EXP DATE BLD: NORMAL
UROBILINOGEN UR QL STRIP.AUTO: 1 EU/DL (ref 0.2–1)
WBC # BLD AUTO: 6.4 K/UL (ref 4.6–13.2)
WBC URNS QL MICRO: ABNORMAL /HPF (ref 0–4)

## 2019-08-12 PROCEDURE — 93975 VASCULAR STUDY: CPT

## 2019-08-12 PROCEDURE — 99284 EMERGENCY DEPT VISIT MOD MDM: CPT

## 2019-08-12 PROCEDURE — 96374 THER/PROPH/DIAG INJ IV PUSH: CPT

## 2019-08-12 PROCEDURE — 81001 URINALYSIS AUTO W/SCOPE: CPT

## 2019-08-12 PROCEDURE — 86900 BLOOD TYPING SEROLOGIC ABO: CPT

## 2019-08-12 PROCEDURE — 81025 URINE PREGNANCY TEST: CPT

## 2019-08-12 PROCEDURE — 85025 COMPLETE CBC W/AUTO DIFF WBC: CPT

## 2019-08-12 PROCEDURE — 80048 BASIC METABOLIC PNL TOTAL CA: CPT

## 2019-08-12 PROCEDURE — 74011250636 HC RX REV CODE- 250/636: Performed by: EMERGENCY MEDICINE

## 2019-08-12 RX ORDER — ONDANSETRON 2 MG/ML
8 INJECTION INTRAMUSCULAR; INTRAVENOUS
Status: COMPLETED | OUTPATIENT
Start: 2019-08-12 | End: 2019-08-12

## 2019-08-12 RX ORDER — ONDANSETRON 4 MG/1
TABLET, ORALLY DISINTEGRATING ORAL
Qty: 10 TAB | Refills: 0 | Status: SHIPPED | OUTPATIENT
Start: 2019-08-12

## 2019-08-12 RX ADMIN — ONDANSETRON 8 MG: 2 INJECTION INTRAMUSCULAR; INTRAVENOUS at 16:41

## 2019-08-12 RX ADMIN — SODIUM CHLORIDE, SODIUM LACTATE, POTASSIUM CHLORIDE, AND CALCIUM CHLORIDE 1000 ML: 600; 310; 30; 20 INJECTION, SOLUTION INTRAVENOUS at 18:57

## 2019-08-12 NOTE — ED PROVIDER NOTES
EMERGENCY DEPARTMENT HISTORY AND PHYSICAL EXAM      Date: 8/12/2019  Patient Name: Jasmin Birch    History of Presenting Illness     Chief Complaint   Patient presents with    Abdominal Pain    Nausea         History (Context): Jasmin Birch is a 30 y.o. Elden Favre, who has history of adnexal mass, who presents with right-sided acute onset, severe, localized abdominal pain associated with nausea and lightheadedness. Her period started today. .  The patient is sexually active with male partners. The patient does use barrier protection. LMP: today    On review of systems, the patient denies vaginal discharge, vaginal bleeding, dyspareunia, hematuria, dysuria, fever, chills, nausea, vomiting, diarrhea, back pain, chest pain, shortness of breath, diaphoresis, rashes, tick bite, recent travel. PCP: Bertrand Moses MD    Current Outpatient Medications   Medication Sig Dispense Refill    ondansetron (ZOFRAN ODT) 4 mg disintegrating tablet Take 1-2 tablets every 6-8 hours as needed for nausea and vomiting. 10 Tab 0    ibuprofen (MOTRIN) 600 mg tablet Take 1 Tab by mouth every six (6) hours as needed for Pain. Indications: pelvic pain 30 Tab 1    acetaminophen (TYLENOL EXTRA STRENGTH) 500 mg tablet Take 1 Tab by mouth every six (6) hours as needed for Pain. Indications: Pain, pelvic pain 30 Tab 0    sertraline (ZOLOFT) 100 mg tablet Take  by mouth daily.  hydrOXYzine (VISTARIL) 50 mg capsule Take 100 mg by mouth three (3) times daily as needed for Itching.  traZODone (DESYREL) 100 mg tablet Take 100 mg by mouth nightly. Past History     Past Medical History:  Past Medical History:   Diagnosis Date    Adnexal mass 06/05/2017    U/S Result: There is a enlarged, tubular structure within the right adnexa, possibly a dilated fallopian tube. Complex hydrosalpinx or pyosalpinx could be considered. Tubo-ovarian abscess is not entirely excluded.                     Anxiety     BV (bacterial vaginosis)     Depression     Emotional instability     Normocytic anemia        Past Surgical History:  No past surgical history on file. Family History:  Family History   Problem Relation Age of Onset    Hypertension Maternal Grandmother     Diabetes Paternal Grandmother     Diabetes Maternal Aunt     Diabetes Paternal Uncle     Hypertension Maternal Uncle        Social History:  Social History     Tobacco Use    Smoking status: Current Every Day Smoker     Packs/day: 1.50     Types: Cigarettes    Smokeless tobacco: Never Used   Substance Use Topics    Alcohol use: No    Drug use: Yes     Types: Marijuana       Allergies: Allergies   Allergen Reactions    Shellfish Derived Anaphylaxis         Review of Systems   As per HPI, otherwise reviewed and negative. Physical Exam     Vitals:    08/12/19 1600 08/12/19 1603 08/12/19 1608 08/12/19 1615   BP: 118/71 122/47  119/76   Pulse: 73 (!) 24  85   Resp: 26 24  18   Temp:  95.5 °F (35.3 °C)     SpO2: 100% 99% 99% 100%       Gen: In clear pain  HEENT: Normocephalic, sclera anicteric  Cardiovascular: Normal rate, regular rhythm, no murmurs, rubs, gallops. Pulses intact and equal distally. Pulmonary: No respiratory distress. No stridor. Clear lungs. ABD: Soft, tender in the suprapubic area are RLQ, nondistended. Neuro: Alert. Normal speech. Normal mentation. Psych: Normal thought content and thought processes. : No CVA tenderness. EXT: Moves all extremities well. No cyanosis or clubbing. Skin: Warm and well-perfused.           Diagnostic Study Results     Labs -     Recent Results (from the past 12 hour(s))   CBC WITH AUTOMATED DIFF    Collection Time: 08/12/19  4:23 PM   Result Value Ref Range    WBC 6.4 4.6 - 13.2 K/uL    RBC 4.22 4.20 - 5.30 M/uL    HGB 12.7 12.0 - 16.0 g/dL    HCT 38.2 35.0 - 45.0 %    MCV 90.5 74.0 - 97.0 FL    MCH 30.1 24.0 - 34.0 PG    MCHC 33.2 31.0 - 37.0 g/dL    RDW 13.7 11.6 - 14.5 %    PLATELET 623 135 - 420 K/uL    MPV 10.8 9.2 - 11.8 FL    NEUTROPHILS 65 40 - 73 %    LYMPHOCYTES 28 21 - 52 %    MONOCYTES 7 3 - 10 %    EOSINOPHILS 0 0 - 5 %    BASOPHILS 0 0 - 2 %    ABS. NEUTROPHILS 4.1 1.8 - 8.0 K/UL    ABS. LYMPHOCYTES 1.8 0.9 - 3.6 K/UL    ABS. MONOCYTES 0.5 0.05 - 1.2 K/UL    ABS. EOSINOPHILS 0.0 0.0 - 0.4 K/UL    ABS. BASOPHILS 0.0 0.0 - 0.1 K/UL    DF AUTOMATED     TYPE & SCREEN    Collection Time: 08/12/19  4:23 PM   Result Value Ref Range    Crossmatch Expiration 08/15/2019     ABO/Rh(D) A POSITIVE     Antibody screen NEG    METABOLIC PANEL, BASIC    Collection Time: 08/12/19  4:23 PM   Result Value Ref Range    Sodium 142 136 - 145 mmol/L    Potassium 4.0 3.5 - 5.5 mmol/L    Chloride 107 100 - 111 mmol/L    CO2 28 21 - 32 mmol/L    Anion gap 7 3.0 - 18 mmol/L    Glucose 125 (H) 74 - 99 mg/dL    BUN 8 7.0 - 18 MG/DL    Creatinine 0.99 0.6 - 1.3 MG/DL    BUN/Creatinine ratio 8 (L) 12 - 20      GFR est AA >60 >60 ml/min/1.73m2    GFR est non-AA >60 >60 ml/min/1.73m2    Calcium 9.2 8.5 - 10.1 MG/DL   URINALYSIS W/ RFLX MICROSCOPIC    Collection Time: 08/12/19  4:30 PM   Result Value Ref Range    Color DARK YELLOW      Appearance CLEAR      Specific gravity >1.030 (H) 1.005 - 1.030    pH (UA) 6.0 5.0 - 8.0      Protein 30 (A) NEG mg/dL    Glucose NEGATIVE  NEG mg/dL    Ketone TRACE (A) NEG mg/dL    Bilirubin NEGATIVE  NEG      Blood LARGE (A) NEG      Urobilinogen 1.0 0.2 - 1.0 EU/dL    Nitrites NEGATIVE  NEG      Leukocyte Esterase NEGATIVE  NEG     HCG URINE, QL    Collection Time: 08/12/19  4:30 PM   Result Value Ref Range    HCG urine, QL NEGATIVE  NEG     URINE MICROSCOPIC ONLY    Collection Time: 08/12/19  4:30 PM   Result Value Ref Range    WBC NONE 0 - 4 /hpf    RBC 35 to 40 0 - 5 /hpf    Epithelial cells 2+ 0 - 5 /lpf    Bacteria FEW (A) NEG /hpf    Mucus 2+ (A) NEG /lpf       Radiologic Studies -   US TRANSVAGINAL W DOPPLER   Final Result   IMPRESSION:      Sonographically unremarkable pelvis. CT Results  (Last 48 hours)    None        CXR Results  (Last 48 hours)    None            Medical Decision Making   I am the first provider for this patient. I reviewed the vital signs, available nursing notes, past medical history, past surgical history, family history and social history. Vital Signs-Reviewed the patient's vital signs. EKG: Interpreted by myself. Records Reviewed: By myself personally on initial evaluation    MDM:   Patient presents with lower abdominal pain. Exam significant for tenderness in the right lower quadrant and suprapubic area. DDX considered: Pregnancy, PID, STI, UTI, ovarian torsion, ectopic pregnancy, functional abdominal pain. DDX thought to be less likely but also considered due to high risk condition: Appendicitis,  bowel obstruction, acute intermittent porphyria. Plan:   Pain Control  Antiemetics  Close Observation    Orders as below:  Orders Placed This Encounter    US TRANSVAGINAL W DOPPLER    CBC WITH AUTOMATED DIFF    URINALYSIS W/ RFLX MICROSCOPIC    HCG URINE, QL    BASIC METABOLIC PANEL    URINE MICROSCOPIC ONLY    TYPE & SCREEN    ondansetron (ZOFRAN) injection 8 mg    lactated ringers bolus infusion 1,000 mL    ondansetron (ZOFRAN ODT) 4 mg disintegrating tablet        ED Course:   ED Course as of Aug 12 2214   Mon Aug 12, 2019   1947 Reevaluated the patient. The patient is pain-free. The patient is nontender in the lower abdomen. She is feeling very well. She is requesting discharge home. She has no nausea at this time. [DT]      ED Course User Index  [DT] Adalberto Stevenson MD     Ultrasound negative. Informed the patient that this cannot rule out torsion. Offered the patient evaluation by GYN. The patient declined. The patient assured me that she would follow-up if she experiences pain again. She knows that she should immediately go to emergency room be evaluated by GYN if this happens again.     Disposition:  Discharge home    DISCHARGE NOTE:   Pt has been reexamined. Patient has no new complaints, changes, or physical findings. Care plan outlined and precautions discussed. Results were reviewed with the patient. All medications were reviewed with the patient; will d/c home with no changes to meds. All of pt's questions and concerns were addressed. Alarm symptoms and return precautions were discussed in detail with the patient. The patient demonstrates adequate understanding. Patient was instructed and agrees to follow up with PCP, as well as to return to the ED upon further deterioration. Patient is ready to go home. The patient understands and agrees with this plan. Patient is very happy with her care. Follow-up Information     Follow up With Specialties Details Why Contact Info    Rose Dawson MD Family Practice  As needed, If symptoms worsen [facility] [address] [place] [address]  [phone]      [facility] EMERGENCY DEPT Emergency Medicine  As needed, If symptoms worsen [address] [facility] [id number]  [phone]          Discharge Medication List as of 8/12/2019  7:47 PM      CONTINUE these medications which have CHANGED    Details   ondansetron (ZOFRAN ODT) 4 mg disintegrating tablet Take 1-2 tablets every 6-8 hours as needed for nausea and vomiting., Print, Disp-10 Tab, R-0         CONTINUE these medications which have NOT CHANGED    Details   ibuprofen (MOTRIN) 600 mg tablet Take 1 Tab by mouth every six (6) hours as needed for Pain. Indications: pelvic pain, Normal, Disp-30 Tab, R-1      acetaminophen (TYLENOL EXTRA STRENGTH) 500 mg tablet Take 1 Tab by mouth every six (6) hours as needed for Pain. Indications: Pain, pelvic pain, Normal, Disp-30 Tab, R-0      sertraline (ZOLOFT) 100 mg tablet Take  by mouth daily. , Historical Med      hydrOXYzine (VISTARIL) 50 mg capsule Take 100 mg by mouth three (3) times daily as needed for Itching., Historical Med      traZODone (DESYREL) 100 mg tablet Take 100 mg by mouth nightly., Historical Med                  Diagnosis     Clinical Impression:   1. Dysmenorrhea    2. Lower abdominal pain    3. Intractable vomiting with nausea, unspecified vomiting type        Signed,  Viviane Phelps MD  Emergency Physician  [facility]    As a voice dictation software was utilized to dictate this note, minor word transpositions can occur. I apologize for confusing wording and typographic errors. Please feel free to contact me for clarification.

## 2019-08-12 NOTE — ED NOTES
Bedside and Verbal shift change report given to Gloria Betts RN (oncoming nurse) by Eduardo Martinez RN (offgoing nurse). Report included the following information SBAR, ED Summary, Procedure Summary, MAR and Recent Results.

## 2019-08-12 NOTE — ED TRIAGE NOTES
Patient arrived to ER via EMS for complaint of nausea and dizziness. Patient started her menstrual cycle today. Patient admits to smoking marijuana today.

## 2022-03-19 PROBLEM — N94.89 ADNEXAL MASS: Status: ACTIVE | Noted: 2017-06-05

## 2022-03-19 PROBLEM — R10.9 ABDOMINAL PAIN: Status: ACTIVE | Noted: 2018-02-12

## 2022-03-19 PROBLEM — N73.0 PID (ACUTE PELVIC INFLAMMATORY DISEASE): Status: ACTIVE | Noted: 2018-02-12

## 2023-12-10 ENCOUNTER — HOSPITAL ENCOUNTER (EMERGENCY)
Facility: HOSPITAL | Age: 38
Discharge: HOME | End: 2023-12-10
Attending: EMERGENCY MEDICINE

## 2023-12-10 VITALS
HEIGHT: 72 IN | BODY MASS INDEX: 21.67 KG/M2 | HEART RATE: 74 BPM | DIASTOLIC BLOOD PRESSURE: 77 MMHG | WEIGHT: 160 LBS | SYSTOLIC BLOOD PRESSURE: 117 MMHG | OXYGEN SATURATION: 96 % | TEMPERATURE: 98.5 F | RESPIRATION RATE: 18 BRPM

## 2023-12-10 DIAGNOSIS — Z20.2 STD EXPOSURE: Primary | ICD-10-CM

## 2023-12-10 PROCEDURE — 6370000000 HC RX 637 (ALT 250 FOR IP): Performed by: EMERGENCY MEDICINE

## 2023-12-10 PROCEDURE — 87491 CHLMYD TRACH DNA AMP PROBE: CPT

## 2023-12-10 PROCEDURE — 87661 TRICHOMONAS VAGINALIS AMPLIF: CPT

## 2023-12-10 PROCEDURE — 87591 N.GONORRHOEAE DNA AMP PROB: CPT

## 2023-12-10 PROCEDURE — 96372 THER/PROPH/DIAG INJ SC/IM: CPT

## 2023-12-10 PROCEDURE — 99284 EMERGENCY DEPT VISIT MOD MDM: CPT

## 2023-12-10 PROCEDURE — 6360000002 HC RX W HCPCS: Performed by: EMERGENCY MEDICINE

## 2023-12-10 PROCEDURE — 2500000003 HC RX 250 WO HCPCS: Performed by: EMERGENCY MEDICINE

## 2023-12-10 PROCEDURE — 86592 SYPHILIS TEST NON-TREP QUAL: CPT

## 2023-12-10 RX ORDER — DOXYCYCLINE HYCLATE 100 MG
100 TABLET ORAL 2 TIMES DAILY
Qty: 14 TABLET | Refills: 0 | Status: SHIPPED | OUTPATIENT
Start: 2023-12-10 | End: 2023-12-17

## 2023-12-10 RX ORDER — DOXYCYCLINE HYCLATE 100 MG/1
100 CAPSULE ORAL
Status: COMPLETED | OUTPATIENT
Start: 2023-12-10 | End: 2023-12-10

## 2023-12-10 RX ADMIN — LIDOCAINE HYDROCHLORIDE 1000 MG: 10 INJECTION, SOLUTION EPIDURAL; INFILTRATION; INTRACAUDAL; PERINEURAL at 11:43

## 2023-12-10 RX ADMIN — DOXYCYCLINE HYCLATE 100 MG: 100 CAPSULE ORAL at 11:43

## 2023-12-10 ASSESSMENT — PAIN - FUNCTIONAL ASSESSMENT: PAIN_FUNCTIONAL_ASSESSMENT: NONE - DENIES PAIN

## 2023-12-10 NOTE — ED PROVIDER NOTES
[facility] EMERGENCY DEPT  EMERGENCY DEPARTMENT ENCOUNTER    Patient Name: Dorothy Abbasi  MRN: [id number]  YOB: 1985  Provider: Gilmer Matthews MD  PCP: Ochoa Hernandez MD   Time/Date of evaluation: 11:23 AM EST on 12/10/23    History of Presenting Illness     Chief Complaint   Patient presents with    Exposure to STD       History Provided By: Patient     History Kath Olson):   Dorothy Abbasi is a 41 y.o. female who presents to the emergency department tearful and upset after finding out that her  cheated on her and here for an STD check specifically denies any signs or symptoms. Nursing Notes were all reviewed and agreed with or any disagreements were addressed in the HPI. Past History     Past Medical History:  Past Medical History:   Diagnosis Date    Acute gastroenteritis 1/16/2014    Adnexal mass 06/05/2017    U/S Result: There is a enlarged, tubular structure within the right adnexa, possibly a dilated fallopian tube. Complex hydrosalpinx or pyosalpinx could be considered. Tubo-ovarian abscess is not entirely excluded. Anxiety     BV (bacterial vaginosis)     Chronic PID (chronic pelvic inflammatory disease) 2018    Depression     Emotional instability (HCC)     Normocytic anemia        Past Surgical History:  No past surgical history on file.     Family History:  Family History   Problem Relation Age of Onset    Hypertension Maternal Grandmother     Hypertension Maternal Uncle     Diabetes Paternal Uncle     Diabetes Maternal Aunt     Diabetes Paternal Grandmother        Social History:  Social History     Tobacco Use    Smoking status: Every Day     Packs/day: 1.5     Types: Cigarettes    Smokeless tobacco: Never   Substance Use Topics    Alcohol use: No    Drug use: Yes     Types: Marijuana Salli Endo)       Medications:  Current Facility-Administered Medications   Medication Dose Route Frequency Provider Last Rate Last Admin    doxycycline hyclate

## 2023-12-11 LAB
C TRACH RRNA SPEC QL NAA+PROBE: NEGATIVE
N GONORRHOEA RRNA SPEC QL NAA+PROBE: NEGATIVE
RPR SER QL: NONREACTIVE
SPECIMEN SOURCE: ABNORMAL
T VAGINALIS RRNA SPEC QL NAA+PROBE: POSITIVE

## 2024-01-31 NOTE — PROGRESS NOTES
Dr Ashford, Madhavi called , was seen this AM, states that she was hoping to be on a medication for her blood pressure.  Diazide in the past. Low dose  [facility] [place].  Please advise  Tanvi Brown RN     Gynecology Progress Note        She is without significant complaints. Pain not controlled on current medication, will increase percocet. Voiding without difficulty. Patient is passing flatus. She is is tolerating her diet.         Current Facility-Administered Medications   Medication Dose Route Frequency    clindamycin phosphate 900 mg in 0.9% sodium chloride (MBP/ADV) 100 mL ADV  900 mg IntraVENous Q8H    ampicillin (OMNIPEN) 2 g in 0.9% sodium chloride (MBP/ADV) 100 mL MBP  2 g IntraVENous Q6H    sodium chloride (NS) flush 5-10 mL  5-10 mL IntraVENous Q8H    gentamicin (GARAMYCIN) 331.2 mg in 0.9% sodium chloride 100 mL IVPB  5 mg/kg (Ideal) IntraVENous Q24H    0.9% sodium chloride infusion  100 mL/hr IntraVENous CONTINUOUS       Vitals:  Visit Vitals    /62 (BP 1 Location: Right arm, BP Patient Position: At rest)    Pulse 95    Temp 97.2 °F (36.2 °C)    Resp 18    Ht 5' 9\" (1.753 m)    Wt 163 lb (73.9 kg)    SpO2 100%    Breastfeeding No    BMI 24.07 kg/m2     Temp (24hrs), Av °F (36.7 °C), Min:97.2 °F (36.2 °C), Max:98.7 °F (37.1 °C)      Last 24hr Input/Output:    Intake/Output Summary (Last 24 hours) at 18 1418  Last data filed at 18 1020   Gross per 24 hour   Intake             1162 ml   Output                0 ml   Net             1162 ml          Exam:  General: alert, cooperative, no distress, appears stated age     Abdomen: abdomen is soft with significant tenderness across lower abdomen, no rebound     Extremities: extremities normal, atraumatic, no cyanosis or edema      Labs: Lab Results   Component Value Date/Time    WBC 7.8 2018 04:20 AM    WBC 8.6 2018 02:44 PM    WBC 11.8 2017 12:00 PM    HGB 10.8 (L) 2018 04:20 AM    HGB 11.4 (L) 2018 02:44 PM    HGB 13.0 2017 12:00 PM    HCT 33.2 (L) 2018 04:20 AM    HCT 34.9 (L) 2018 02:44 PM    HCT 38.6 2017 12:00 PM    PLATELET 238  04:20 AM    PLATELET 637 02/12/2018 02:44 PM    PLATELET 965 83/02/7608 12:00 PM       Recent Results (from the past 24 hour(s))   CBC WITH AUTOMATED DIFF    Collection Time: 02/12/18  2:44 PM   Result Value Ref Range    WBC 8.6 4.6 - 13.2 K/uL    RBC 4.01 (L) 4.20 - 5.30 M/uL    HGB 11.4 (L) 12.0 - 16.0 g/dL    HCT 34.9 (L) 35.0 - 45.0 %    MCV 87.0 74.0 - 97.0 FL    MCH 28.4 24.0 - 34.0 PG    MCHC 32.7 31.0 - 37.0 g/dL    RDW 15.2 (H) 11.6 - 14.5 %    PLATELET 516 856 - 673 K/uL    MPV 11.0 9.2 - 11.8 FL    NEUTROPHILS 63 40 - 73 %    LYMPHOCYTES 26 21 - 52 %    MONOCYTES 11 (H) 3 - 10 %    EOSINOPHILS 0 0 - 5 %    BASOPHILS 0 0 - 2 %    ABS. NEUTROPHILS 5.5 1.8 - 8.0 K/UL    ABS. LYMPHOCYTES 2.2 0.9 - 3.6 K/UL    ABS. MONOCYTES 0.9 0.05 - 1.2 K/UL    ABS. EOSINOPHILS 0.0 0.0 - 0.4 K/UL    ABS. BASOPHILS 0.0 0.0 - 0.1 K/UL    DF AUTOMATED     WET PREP    Collection Time: 02/12/18  4:20 PM   Result Value Ref Range    Special Requests: NO SPECIAL REQUESTS      Wet prep MANY  CLUE CELLS PRESENT        Wet prep NO YEAST SEEN      Wet prep NO TRICHOMONAS SEEN     METABOLIC PANEL, COMPREHENSIVE    Collection Time: 02/12/18  4:20 PM   Result Value Ref Range    Sodium 138 136 - 145 mmol/L    Potassium 3.9 3.5 - 5.5 mmol/L    Chloride 106 100 - 108 mmol/L    CO2 27 21 - 32 mmol/L    Anion gap 5 3.0 - 18 mmol/L    Glucose 83 74 - 99 mg/dL    BUN 5 (L) 7.0 - 18 MG/DL    Creatinine 0.68 0.6 - 1.3 MG/DL    BUN/Creatinine ratio 7 (L) 12 - 20      GFR est AA >60 >60 ml/min/1.73m2    GFR est non-AA >60 >60 ml/min/1.73m2    Calcium 8.2 (L) 8.5 - 10.1 MG/DL    Bilirubin, total 0.5 0.2 - 1.0 MG/DL    ALT (SGPT) 11 (L) 13 - 56 U/L    AST (SGOT) 11 (L) 15 - 37 U/L    Alk.  phosphatase 72 45 - 117 U/L    Protein, total 6.5 6.4 - 8.2 g/dL    Albumin 3.0 (L) 3.4 - 5.0 g/dL    Globulin 3.5 2.0 - 4.0 g/dL    A-G Ratio 0.9 0.8 - 1.7     METABOLIC PANEL, COMPREHENSIVE    Collection Time: 02/13/18  4:20 AM   Result Value Ref Range    Sodium 136 136 - 145 mmol/L Potassium 3.9 3.5 - 5.5 mmol/L    Chloride 105 100 - 108 mmol/L    CO2 27 21 - 32 mmol/L    Anion gap 4 3.0 - 18 mmol/L    Glucose 86 74 - 99 mg/dL    BUN 6 (L) 7.0 - 18 MG/DL    Creatinine 0.71 0.6 - 1.3 MG/DL    BUN/Creatinine ratio 8 (L) 12 - 20      GFR est AA >60 >60 ml/min/1.73m2    GFR est non-AA >60 >60 ml/min/1.73m2    Calcium 8.2 (L) 8.5 - 10.1 MG/DL    Bilirubin, total 0.7 0.2 - 1.0 MG/DL    ALT (SGPT) 10 (L) 13 - 56 U/L    AST (SGOT) 10 (L) 15 - 37 U/L    Alk. phosphatase 68 45 - 117 U/L    Protein, total 6.2 (L) 6.4 - 8.2 g/dL    Albumin 2.9 (L) 3.4 - 5.0 g/dL    Globulin 3.3 2.0 - 4.0 g/dL    A-G Ratio 0.9 0.8 - 1.7     CBC WITH AUTOMATED DIFF    Collection Time: 02/13/18  4:20 AM   Result Value Ref Range    WBC 7.8 4.6 - 13.2 K/uL    RBC 3.67 (L) 4.20 - 5.30 M/uL    HGB 10.8 (L) 12.0 - 16.0 g/dL    HCT 33.2 (L) 35.0 - 45.0 %    MCV 90.5 74.0 - 97.0 FL    MCH 29.4 24.0 - 34.0 PG    MCHC 32.5 31.0 - 37.0 g/dL    RDW 15.3 (H) 11.6 - 14.5 %    PLATELET 544 354 - 836 K/uL    MPV 11.2 9.2 - 11.8 FL    NEUTROPHILS 54 40 - 73 %    LYMPHOCYTES 32 21 - 52 %    MONOCYTES 14 (H) 3 - 10 %    EOSINOPHILS 0 0 - 5 %    BASOPHILS 0 0 - 2 %    ABS. NEUTROPHILS 4.2 1.8 - 8.0 K/UL    ABS. LYMPHOCYTES 2.5 0.9 - 3.6 K/UL    ABS. MONOCYTES 1.1 0.05 - 1.2 K/UL    ABS. EOSINOPHILS 0.0 0.0 - 0.4 K/UL    ABS.  BASOPHILS 0.0 0.0 - 0.1 K/UL    DF AUTOMATED     GENTAMICIN, RANDOM    Collection Time: 02/13/18  8:40 AM   Result Value Ref Range    Gentamicin,random 0.6 0.5 - 10 ug/ml           Assesment/Plan:     [id number] with h/o PID now with evidence acute on chronic PID on exam s/p failed outpatient management    Continue Amp/Gent/Clinda  GC/CT/TV P  Vaginal cultures P  Wet prep showed bacterial vaginosis--> on IV clindamycin  Increase percocet for pain  Regular diet      Misael Spaulding MD  [id number]:05 PM
